# Patient Record
Sex: FEMALE | Race: BLACK OR AFRICAN AMERICAN | NOT HISPANIC OR LATINO | Employment: FULL TIME | URBAN - METROPOLITAN AREA
[De-identification: names, ages, dates, MRNs, and addresses within clinical notes are randomized per-mention and may not be internally consistent; named-entity substitution may affect disease eponyms.]

---

## 2022-08-23 ENCOUNTER — OFFICE VISIT (OUTPATIENT)
Dept: PAIN MEDICINE | Facility: CLINIC | Age: 47
End: 2022-08-23
Payer: COMMERCIAL

## 2022-08-23 VITALS
WEIGHT: 236.88 LBS | BODY MASS INDEX: 40.44 KG/M2 | DIASTOLIC BLOOD PRESSURE: 92 MMHG | SYSTOLIC BLOOD PRESSURE: 128 MMHG | HEART RATE: 87 BPM | HEIGHT: 64 IN

## 2022-08-23 DIAGNOSIS — G89.29 CHRONIC BILATERAL LOW BACK PAIN, UNSPECIFIED WHETHER SCIATICA PRESENT: Primary | ICD-10-CM

## 2022-08-23 DIAGNOSIS — M54.50 CHRONIC BILATERAL LOW BACK PAIN, UNSPECIFIED WHETHER SCIATICA PRESENT: Primary | ICD-10-CM

## 2022-08-23 PROCEDURE — 3008F BODY MASS INDEX DOCD: CPT | Mod: CPTII,S$GLB,, | Performed by: PHYSICIAN ASSISTANT

## 2022-08-23 PROCEDURE — 1160F PR REVIEW ALL MEDS BY PRESCRIBER/CLIN PHARMACIST DOCUMENTED: ICD-10-PCS | Mod: CPTII,S$GLB,, | Performed by: PHYSICIAN ASSISTANT

## 2022-08-23 PROCEDURE — 99203 PR OFFICE/OUTPT VISIT, NEW, LEVL III, 30-44 MIN: ICD-10-PCS | Mod: S$GLB,,, | Performed by: PHYSICIAN ASSISTANT

## 2022-08-23 PROCEDURE — 1159F MED LIST DOCD IN RCRD: CPT | Mod: CPTII,S$GLB,, | Performed by: PHYSICIAN ASSISTANT

## 2022-08-23 PROCEDURE — 99999 PR PBB SHADOW E&M-EST. PATIENT-LVL IV: CPT | Mod: PBBFAC,,, | Performed by: PHYSICIAN ASSISTANT

## 2022-08-23 PROCEDURE — 1159F PR MEDICATION LIST DOCUMENTED IN MEDICAL RECORD: ICD-10-PCS | Mod: CPTII,S$GLB,, | Performed by: PHYSICIAN ASSISTANT

## 2022-08-23 PROCEDURE — 1160F RVW MEDS BY RX/DR IN RCRD: CPT | Mod: CPTII,S$GLB,, | Performed by: PHYSICIAN ASSISTANT

## 2022-08-23 PROCEDURE — 99999 PR PBB SHADOW E&M-EST. PATIENT-LVL IV: ICD-10-PCS | Mod: PBBFAC,,, | Performed by: PHYSICIAN ASSISTANT

## 2022-08-23 PROCEDURE — 3080F PR MOST RECENT DIASTOLIC BLOOD PRESSURE >= 90 MM HG: ICD-10-PCS | Mod: CPTII,S$GLB,, | Performed by: PHYSICIAN ASSISTANT

## 2022-08-23 PROCEDURE — 3074F SYST BP LT 130 MM HG: CPT | Mod: CPTII,S$GLB,, | Performed by: PHYSICIAN ASSISTANT

## 2022-08-23 PROCEDURE — 99203 OFFICE O/P NEW LOW 30 MIN: CPT | Mod: S$GLB,,, | Performed by: PHYSICIAN ASSISTANT

## 2022-08-23 PROCEDURE — 3008F PR BODY MASS INDEX (BMI) DOCUMENTED: ICD-10-PCS | Mod: CPTII,S$GLB,, | Performed by: PHYSICIAN ASSISTANT

## 2022-08-23 PROCEDURE — 3074F PR MOST RECENT SYSTOLIC BLOOD PRESSURE < 130 MM HG: ICD-10-PCS | Mod: CPTII,S$GLB,, | Performed by: PHYSICIAN ASSISTANT

## 2022-08-23 PROCEDURE — 3080F DIAST BP >= 90 MM HG: CPT | Mod: CPTII,S$GLB,, | Performed by: PHYSICIAN ASSISTANT

## 2022-08-23 RX ORDER — CETIRIZINE HYDROCHLORIDE 10 MG/1
10 TABLET ORAL
COMMUNITY
Start: 2021-09-14 | End: 2022-09-14

## 2022-08-23 RX ORDER — DEXTROAMPHETAMINE SULFATE, DEXTROAMPHETAMINE SACCHARATE, AMPHETAMINE ASPARTATE MONOHYDRATE, AND AMPHETAMINE SULFATE 3.125; 3.125; 3.125; 3.125 MG/1; MG/1; MG/1; MG/1
1 CAPSULE, EXTENDED RELEASE ORAL EVERY MORNING
COMMUNITY
Start: 2022-08-18 | End: 2023-08-01

## 2022-08-23 RX ORDER — RIMEGEPANT SULFATE 75 MG/75MG
TABLET, ORALLY DISINTEGRATING ORAL
COMMUNITY
Start: 2022-08-16

## 2022-08-23 RX ORDER — PANTOPRAZOLE SODIUM 40 MG/1
40 TABLET, DELAYED RELEASE ORAL
COMMUNITY
Start: 2022-07-19

## 2022-08-23 RX ORDER — TRIAMTERENE/HYDROCHLOROTHIAZID 37.5-25 MG
TABLET ORAL
COMMUNITY
End: 2023-08-01

## 2022-08-23 RX ORDER — METHOCARBAMOL 500 MG/1
500 TABLET, FILM COATED ORAL
COMMUNITY
Start: 2022-03-11

## 2022-08-23 RX ORDER — SEMAGLUTIDE 1.34 MG/ML
0.5 INJECTION, SOLUTION SUBCUTANEOUS
COMMUNITY
Start: 2022-07-19 | End: 2023-08-01

## 2022-08-23 RX ORDER — BUPROPION HYDROCHLORIDE 300 MG/1
300 TABLET ORAL
COMMUNITY
Start: 2021-09-14

## 2022-08-23 RX ORDER — ACETAMINOPHEN AND CODEINE PHOSPHATE 120; 12 MG/5ML; MG/5ML
1 SOLUTION ORAL DAILY
COMMUNITY
Start: 2022-07-01 | End: 2023-09-21

## 2022-08-23 RX ORDER — VALACYCLOVIR HYDROCHLORIDE 500 MG/1
500 TABLET, FILM COATED ORAL
COMMUNITY
Start: 2022-08-17

## 2022-08-23 RX ORDER — FLUOXETINE HYDROCHLORIDE 20 MG/1
20 CAPSULE ORAL
COMMUNITY
Start: 2022-07-19 | End: 2023-08-01
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
## 2022-08-31 ENCOUNTER — CLINICAL SUPPORT (OUTPATIENT)
Dept: REHABILITATION | Facility: HOSPITAL | Age: 47
End: 2022-08-31
Payer: COMMERCIAL

## 2022-08-31 DIAGNOSIS — G89.29 CHRONIC BILATERAL LOW BACK PAIN, UNSPECIFIED WHETHER SCIATICA PRESENT: ICD-10-CM

## 2022-08-31 DIAGNOSIS — M54.50 CHRONIC BILATERAL LOW BACK PAIN WITHOUT SCIATICA: ICD-10-CM

## 2022-08-31 DIAGNOSIS — M54.50 CHRONIC BILATERAL LOW BACK PAIN, UNSPECIFIED WHETHER SCIATICA PRESENT: ICD-10-CM

## 2022-08-31 DIAGNOSIS — G89.29 CHRONIC BILATERAL LOW BACK PAIN WITHOUT SCIATICA: ICD-10-CM

## 2022-08-31 DIAGNOSIS — R29.898 DECREASED STRENGTH OF TRUNK AND BACK: ICD-10-CM

## 2022-08-31 PROCEDURE — 97110 THERAPEUTIC EXERCISES: CPT | Mod: PO | Performed by: PHYSICAL THERAPIST

## 2022-08-31 PROCEDURE — 97161 PT EVAL LOW COMPLEX 20 MIN: CPT | Mod: PO | Performed by: PHYSICAL THERAPIST
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
## 2022-09-01 PROBLEM — M54.50 CHRONIC BILATERAL LOW BACK PAIN WITHOUT SCIATICA: Status: ACTIVE | Noted: 2022-09-01

## 2022-09-01 PROBLEM — R29.898 DECREASED STRENGTH OF TRUNK AND BACK: Status: ACTIVE | Noted: 2022-09-01

## 2022-09-01 PROBLEM — G89.29 CHRONIC BILATERAL LOW BACK PAIN WITHOUT SCIATICA: Status: ACTIVE | Noted: 2022-09-01

## 2022-09-07 ENCOUNTER — CLINICAL SUPPORT (OUTPATIENT)
Dept: REHABILITATION | Facility: HOSPITAL | Age: 47
End: 2022-09-07
Payer: COMMERCIAL

## 2022-09-07 DIAGNOSIS — M54.50 CHRONIC BILATERAL LOW BACK PAIN WITHOUT SCIATICA: Primary | ICD-10-CM

## 2022-09-07 DIAGNOSIS — G89.29 CHRONIC BILATERAL LOW BACK PAIN WITHOUT SCIATICA: Primary | ICD-10-CM

## 2022-09-07 DIAGNOSIS — R29.898 DECREASED STRENGTH OF TRUNK AND BACK: ICD-10-CM

## 2022-09-07 PROCEDURE — 97110 THERAPEUTIC EXERCISES: CPT | Mod: PO,CQ

## 2022-09-07 NOTE — PROGRESS NOTES
[facility]      Name: Natali Tubbs  Clinic Number: [id number]    Therapy Diagnosis:   Encounter Diagnoses   Name Primary?    Chronic bilateral low back pain without sciatica Yes    Decreased strength of trunk and back      Physician: Sharmin Aguirre PA-C    Visit Date: 2022    Physician: Sharmin Aguirre PA-C     Physician Orders: PT Eval and Treat [facility]  Medical Diagnosis from Referral: Chronic bilateral low back pain  Evaluation Date: 2022  Authorization Period Expiration: 23  Plan of Care Expiration: 22  Reassessment Due: 10th visit  Visit # / Visits authorized:      Time In: 3:23 PM  Time Out: 4:21PM  Total Billable Time:  45 minutes  Insurance:Fee for service Insurance Patient        Precautions: Standard     Pattern of pain determined: Pattern 2  Subjective   Natali reports that the stretches help her a little.    Patient reports tolerating previous visit was well with minimal LB soreness.  Patient reports their pain to be  3 /10 on a 0-10 scale with 0 being no pain and 10 being the worst pain imaginable.  Pain Location: B LB     Occupation: nurse practioner  Leisure: exercise, ride bike, read      Pts goals: conservative way to manage her back pain, do exercise that doesn't hurt her back  Objective     Baseline IM Testing Results:     Baseline Isometric Testing on Med X equipment: Testing administered by PT  Date of testin22  ROM  0-45 deg   Max Peak Torque  131   Min Peak Torque  42   Flex/Ext Ratio 3:1   % below normative data 42     Treatment    Pt was instructed in and performed the following:     Natali received therapeutic exercises to develop/improved posture, cardiovascular endurance, muscular endurance, lumbar/cervical ROM, strength and muscular endurance for 58 minutes including the following exercises:     Single KTC 5x 5 sec ea  Bilateral KTC 5 sec x5  Posterior pelvic tilt x20  Seated lumbar flexion 5x 5  sec  [facility] 9/7/2022   Visit Number 2   VAS Pain Rating 3   Time 10   Flexion in Lying 5   Flexion in Sitting 5   Lumbar Extension Seat Pad -   Femur Restraint -   Top Dead Center -   Counterweight -   Lumbar Flexion -   Lumbar Extension -   Lumbar Peak Torque -   Min Torque -   Test Percent Below Normative Data -   Lumbar Weight 65   Repetitions 18   Rating of Perceived Exertion 4   Ice - Z Lie (in min.) 10        Peripheral muscle strengthening which included 1 set of 15-20 repetitions at a slow, controlled 10-13 second per rep pace focused on strengthening supporting musculature for improved body mechanics and functional mobility.  Pt and therapist focused on proper form during treatment to ensure optimal strengthening of each targeted muscle group.  Machines were utilized including torso rotation, leg extension, leg curl, chest press, upright row. Tricep extension, bicep curl, leg press, and hip abduction added visit 3    Natali received the following manual therapy techniques: n/a were applied to the: n/a for 00 minutes.         Home Exercises Provided and Patient Education Provided   Home exercises include:  Single KTC 5x 5 sec ea  Bilateral KTC 5 sec x5  Posterior pelvic tilt x20  Seated lumbar flexion 5x 5 sec    Cardio program:  Lifting education date:  Posture/Lumbar roll:     Education provided:   - Educated pt on the importance of daily stretch to increase the benefit of therapy and positive results.      Written Home Exercises Provided: Patient instructed to cont prior HEP.  Exercises were reviewed and Justin was able to demonstrate them prior to the end of the session.  Justin demonstrated good  understanding of the education provided.     See EMR under Patient Instructions for exercises provided prior visit.          Assessment   Pt with minimal LBP that did decrease a little during and post session. Reviewed HEP with mod vc for tech. Pt demo well. Pt tolerated lumbar medx machine with  starting weight 50% of pts max peak torque with no c/o pain. Pt tolerated the medx machines well to the LE machines with no c/o increased LB pain or any limb pain.       Patient is making good progress towards established goals.  Pt will continue to benefit from skilled outpatient physical therapy to address the deficits stated in the impairment chart, provide pt/family education and to maximize pt's level of independence in the home and community environment.     Anticipated Barriers for therapy: none  Pt's spiritual, cultural and educational needs considered and pt agreeable to plan of care and goals as stated below:       GOALS: Pt is in agreement with the following goals.     Short term goals:  6 weeks or 10 visits   1.  Pt will demonstrate increased lumbar ROM by at least 3 degrees from the initial ROM value with improvements noted in functional ROM and ability to perform ADLs.  (approp and ongoing)  2.  Pt will demonstrate increased MedX average isometric strength value  by 20% from initial test resulting in improved ability to perform bending, lifting, and carrying activities safely, confidently.  (approp and ongoing)  3.  Patient report a reduction in worst pain score by 1-2 points for improved tolerance for standing, prolonged sitting and exercise.  (approp and ongoing)  4.  Pt able to perform HEP correctly with minimal cueing or supervision from therapist to encourage independent management of symptoms. (approp and ongoing)        Long term goals: 10 weeks or 20 visits   1. Pt will demonstrate increased lumbar ROM by at least 6 degrees from initial ROM value, resulting in improved ability to perform functional fwd bending while standing and sitting. (approp and ongoing)  2. Pt will demonstrate increased MedX average isometric strength value  by 30% from initial test resulting in improved ability to perform bending, lifting, and carrying activities safely, confidently.  (approp and ongoing)  3. Pt to  demonstrate ability to independently control and reduce their pain through posture positioning and mechanical movements throughout a typical day.  (approp and ongoing)  4.  Pt will demonstrate reduced pain and improved functional outcomes as reported on the FOTO by reaching a limitation score of < or = 28% or less in order to demonstrate subjective improvement in pt's condition.    (approp and ongoing)  5. Pt will demonstrate independence with the HEP at discharge  (approp and ongoing)  6.  Pt will understand and apply conservative way to manage her back pain, do exercise that doesn't hurt her back(patient goal)  (approp and ongoing    Plan   Continue with established Plan of Care towards established PT goals.

## 2022-09-08 ENCOUNTER — PATIENT MESSAGE (OUTPATIENT)
Dept: REHABILITATION | Facility: HOSPITAL | Age: 47
End: 2022-09-08
Payer: COMMERCIAL

## 2022-09-12 ENCOUNTER — CLINICAL SUPPORT (OUTPATIENT)
Dept: REHABILITATION | Facility: HOSPITAL | Age: 47
End: 2022-09-12
Payer: COMMERCIAL

## 2022-09-12 DIAGNOSIS — M54.50 CHRONIC BILATERAL LOW BACK PAIN WITHOUT SCIATICA: Primary | ICD-10-CM

## 2022-09-12 DIAGNOSIS — R29.898 DECREASED STRENGTH OF TRUNK AND BACK: ICD-10-CM

## 2022-09-12 DIAGNOSIS — G89.29 CHRONIC BILATERAL LOW BACK PAIN WITHOUT SCIATICA: Primary | ICD-10-CM

## 2022-09-12 PROCEDURE — 97110 THERAPEUTIC EXERCISES: CPT | Mod: PO | Performed by: PHYSICAL THERAPIST

## 2022-09-12 NOTE — PROGRESS NOTES
[facility]      Name: Natali Steen Tubbs  Clinic Number: [id number]    Therapy Diagnosis:   Encounter Diagnoses   Name Primary?    Chronic bilateral low back pain without sciatica Yes    Decreased strength of trunk and back        Physician: Sharmin Aguirre PA-C    Visit Date: 2022    Physician: Sharmin Aguirre PA-C     Physician Orders: PT Eval and Treat [facility]  Medical Diagnosis from Referral: Chronic bilateral low back pain  Evaluation Date: 2022  Authorization Period Expiration: 23  Plan of Care Expiration: 22  Reassessment Due: 10th visit  Visit # / Visits authorized: 3/ 20     Time In: 3:10PM  Time Out: 4:05PM  Total Billable Time:  45 minutes  Insurance:Fee for service Insurance Patient        Precautions: Standard     Pattern of pain determined: Pattern 2  Subjective   Natali reports that she took grandchildren to jumping party and did a lot of cooking this weekend. Standing increases pain. Long day at work today and has some increased pain. She reports some decrease in pain with stretches.    Patient reports tolerating previous visit was well with minimal LB soreness.  Patient reports their pain to be  7 /10 on a 0-10 scale with 0 being no pain and 10 being the worst pain imaginable.  Pain Location:  bilateral back, post thighs      Occupation: nurse practioner  Leisure: exercise, ride bike, read      Pts goals: conservative way to manage her back pain, do exercise that doesn't hurt her back  Objective     Baseline IM Testing Results:     Baseline Isometric Testing on Med X equipment: Testing administered by PT  Date of testin22  ROM  0-45 deg   Max Peak Torque  131   Min Peak Torque  42   Flex/Ext Ratio 3:1   % below normative data 42     Treatment    Pt was instructed in and performed the following:     Natali received therapeutic exercises to develop/improved posture, cardiovascular endurance, muscular endurance, lumbar/cervical  ROM, strength and muscular endurance for 45 minutes including the following exercises:     Single KTC 5x 5 sec ea  Bilateral KTC 5 sec x5  Posterior pelvic tilt x20  Seated lumbar flexion 5x 5 sec  Lumbar stabilization supine march in place x20    [facility] 9/12/2022   Visit Number 3   VAS Pain Rating 7   Time -   Flexion in Lying 5   Flexion in Sitting 5   Lumbar Extension Seat Pad -   Femur Restraint -   Top Dead Center -   Counterweight -   Lumbar Flexion -   Lumbar Extension -   Lumbar Peak Torque -   Min Torque -   Test Percent Below Normative Data -   Lumbar Weight 65   Repetitions 18   Rating of Perceived Exertion 5   Ice - Z Lie (in min.) 10            Peripheral muscle strengthening which included 1 set of 15-20 repetitions at a slow, controlled 10-13 second per rep pace focused on strengthening supporting musculature for improved body mechanics and functional mobility.  Pt and therapist focused on proper form during treatment to ensure optimal strengthening of each targeted muscle group.  Machines were utilized including torso rotation, leg extension, leg curl, chest press, upright row. Tricep extension, bicep curl, leg press, and hip abduction added visit 3    Natali received the following manual therapy techniques: n/a were applied to the: n/a for 00 minutes.         Home Exercises Provided and Patient Education Provided   Home exercises include:  Single KTC 5x 5 sec ea  Bilateral KTC 5 sec x5  Posterior pelvic tilt x20  Seated lumbar flexion 5x 5 sec    Cardio program:  Lifting education date:  Posture/Lumbar roll:     Education provided:   - Educated pt on the importance of daily stretch to increase the benefit of therapy and positive results.      Written Home Exercises Provided: Patient instructed to cont prior HEP.  Exercises were reviewed and Justin was able to demonstrate them prior to the end of the session.  Justin demonstrated good  understanding of the education provided.     See  EMR under Patient Instructions for exercises provided prior visit.          Assessment     Pt needed verbal and manual cues to correct posterior pelvic tilt. Once achieved proper form, added more lumbar stabilization. Pt responds favorably to flexion stretch. Encouraged frequent seated flexion stretch throughout day as well as awareness of improved abdominal tone. Exercises slightly more challenging today with higher perceived exertion. Initiated UE exercise today.     Patient is making good progress towards established goals.  Pt will continue to benefit from skilled outpatient physical therapy to address the deficits stated in the impairment chart, provide pt/family education and to maximize pt's level of independence in the home and community environment.     Anticipated Barriers for therapy: none  Pt's spiritual, cultural and educational needs considered and pt agreeable to plan of care and goals as stated below:       GOALS: Pt is in agreement with the following goals.     Short term goals:  6 weeks or 10 visits   1.  Pt will demonstrate increased lumbar ROM by at least 3 degrees from the initial ROM value with improvements noted in functional ROM and ability to perform ADLs.  (approp and ongoing)  2.  Pt will demonstrate increased MedX average isometric strength value  by 20% from initial test resulting in improved ability to perform bending, lifting, and carrying activities safely, confidently.  (approp and ongoing)  3.  Patient report a reduction in worst pain score by 1-2 points for improved tolerance for standing, prolonged sitting and exercise.  (approp and ongoing)  4.  Pt able to perform HEP correctly with minimal cueing or supervision from therapist to encourage independent management of symptoms. (approp and ongoing)        Long term goals: 10 weeks or 20 visits   1. Pt will demonstrate increased lumbar ROM by at least 6 degrees from initial ROM value, resulting in improved ability to perform  functional fwd bending while standing and sitting. (approp and ongoing)  2. Pt will demonstrate increased MedX average isometric strength value  by 30% from initial test resulting in improved ability to perform bending, lifting, and carrying activities safely, confidently.  (approp and ongoing)  3. Pt to demonstrate ability to independently control and reduce their pain through posture positioning and mechanical movements throughout a typical day.  (approp and ongoing)  4.  Pt will demonstrate reduced pain and improved functional outcomes as reported on the FOTO by reaching a limitation score of < or = 28% or less in order to demonstrate subjective improvement in pt's condition.    (approp and ongoing)  5. Pt will demonstrate independence with the HEP at discharge  (approp and ongoing)  6.  Pt will understand and apply conservative way to manage her back pain, do exercise that doesn't hurt her back(patient goal)  (approp and ongoing    Plan   Continue with established Plan of Care towards established PT goals.

## 2022-09-14 ENCOUNTER — CLINICAL SUPPORT (OUTPATIENT)
Dept: REHABILITATION | Facility: HOSPITAL | Age: 47
End: 2022-09-14
Payer: COMMERCIAL

## 2022-09-14 DIAGNOSIS — R29.898 DECREASED STRENGTH OF TRUNK AND BACK: ICD-10-CM

## 2022-09-14 DIAGNOSIS — M54.50 CHRONIC BILATERAL LOW BACK PAIN WITHOUT SCIATICA: Primary | ICD-10-CM

## 2022-09-14 DIAGNOSIS — G89.29 CHRONIC BILATERAL LOW BACK PAIN WITHOUT SCIATICA: Primary | ICD-10-CM

## 2022-09-14 PROCEDURE — 97110 THERAPEUTIC EXERCISES: CPT | Mod: PO | Performed by: PHYSICAL THERAPIST

## 2022-09-14 PROCEDURE — 97750 PHYSICAL PERFORMANCE TEST: CPT | Mod: 32,PO | Performed by: PHYSICAL THERAPIST

## 2022-09-14 NOTE — PROGRESS NOTES
[facility]      Name: Natali Tubbs  Clinic Number: [id number]    Therapy Diagnosis:   Encounter Diagnoses   Name Primary?    Chronic bilateral low back pain without sciatica Yes    Decreased strength of trunk and back          Physician: Sharmin Aguirre PA-C    Visit Date: 2022    Physician: Sharmin Aguirre PA-C     Physician Orders: PT Eval and Treat [facility]  Medical Diagnosis from Referral: Chronic bilateral low back pain  Evaluation Date: 2022  Authorization Period Expiration: 23  Plan of Care Expiration: 22  Reassessment Due: 10th visit  Visit # / Visits authorized:      Time In: 2:30PM  Time Out: 3:25PM  Total Billable Time:  45 minutes  Insurance:Fee for service Insurance Patient        Precautions: Standard     Pattern of pain determined: Pattern 2  Subjective   Natali reports she is feeling better today, but was sore all over following last visit.  Patient reports tolerating previous visit was well with general soreness.  Patient reports their pain to be  2 /10 on a 0-10 scale with 0 being no pain and 10 being the worst pain imaginable.  Pain Location:  bilateral back, post thighs      Occupation: nurse practioner  Leisure: exercise, ride bike, read      Pts goals: conservative way to manage her back pain, do exercise that doesn't hurt her back  Objective     Baseline IM Testing Results:     Baseline Isometric Testing on Med X equipment: Testing administered by PT  Date of testin22  ROM  0-45 deg   Max Peak Torque  131   Min Peak Torque  42   Flex/Ext Ratio 3:1   % below normative data 42     Treatment    Pt was instructed in and performed the following:     Natali received therapeutic exercises to develop/improved posture, cardiovascular endurance, muscular endurance, lumbar/cervical ROM, strength and muscular endurance for 45 minutes including the following exercises:     Single KTC 5x 5 sec ea  Bilateral KTC 5 sec  x5  Posterior pelvic tilt x20  Seated lumbar flexion 5x 5 sec  Lumbar stabilization supine march in place x20  Lumbar stabilization leg extension 2/10 ea    [facility] 9/14/2022   Visit Number 4   VAS Pain Rating 2   Time -   Flexion in Lying 5   Flexion in Sitting 5   Lumbar Extension Seat Pad -   Femur Restraint -   Top Dead Center -   Counterweight -   Lumbar Flexion -   Lumbar Extension -   Lumbar Peak Torque -   Min Torque -   Test Percent Below Normative Data -   Lumbar Weight 65   Repetitions 16   Rating of Perceived Exertion 6   Ice - Z Lie (in min.) 10                Peripheral muscle strengthening which included 1 set of 15-20 repetitions at a slow, controlled 10-13 second per rep pace focused on strengthening supporting musculature for improved body mechanics and functional mobility.  Pt and therapist focused on proper form during treatment to ensure optimal strengthening of each targeted muscle group.  Machines were utilized including torso rotation, leg extension, leg curl, chest press, upright row. Tricep extension, bicep curl, leg press, and hip abduction added visit 3    Natali received the following manual therapy techniques: n/a were applied to the: n/a for 00 minutes.         Home Exercises Provided and Patient Education Provided   Home exercises include:  Single KTC 5x 5 sec ea  Bilateral KTC 5 sec x5  Posterior pelvic tilt x20  Seated lumbar flexion 5x 5 sec    Cardio program:  Lifting education date:  Posture/Lumbar roll:     Education provided:   - Educated pt on the importance of daily stretch to increase the benefit of therapy and positive results.      Written Home Exercises Provided: Patient instructed to cont prior HEP.  Exercises were reviewed and Justin was able to demonstrate them prior to the end of the session.  Justin demonstrated good  understanding of the education provided.     See EMR under Patient Instructions for exercises provided prior visit.          Assessment      Pt has reduced pain level today. Much improved control with lumbar stabilization. Advanced to leg extension with PPT. Demonstrated good control and stability. Higher perceived exertion on MEDX and leg curl exercises today. Improved with torso rotation.  Patient is making good progress towards established goals.  Pt will continue to benefit from skilled outpatient physical therapy to address the deficits stated in the impairment chart, provide pt/family education and to maximize pt's level of independence in the home and community environment.     Anticipated Barriers for therapy: none  Pt's spiritual, cultural and educational needs considered and pt agreeable to plan of care and goals as stated below:       GOALS: Pt is in agreement with the following goals.     Short term goals:  6 weeks or 10 visits   1.  Pt will demonstrate increased lumbar ROM by at least 3 degrees from the initial ROM value with improvements noted in functional ROM and ability to perform ADLs.  (approp and ongoing)  2.  Pt will demonstrate increased MedX average isometric strength value  by 20% from initial test resulting in improved ability to perform bending, lifting, and carrying activities safely, confidently.  (approp and ongoing)  3.  Patient report a reduction in worst pain score by 1-2 points for improved tolerance for standing, prolonged sitting and exercise.  (approp and ongoing)  4.  Pt able to perform HEP correctly with minimal cueing or supervision from therapist to encourage independent management of symptoms. (approp and ongoing)        Long term goals: 10 weeks or 20 visits   1. Pt will demonstrate increased lumbar ROM by at least 6 degrees from initial ROM value, resulting in improved ability to perform functional fwd bending while standing and sitting. (approp and ongoing)  2. Pt will demonstrate increased MedX average isometric strength value  by 30% from initial test resulting in improved ability to perform bending,  lifting, and carrying activities safely, confidently.  (approp and ongoing)  3. Pt to demonstrate ability to independently control and reduce their pain through posture positioning and mechanical movements throughout a typical day.  (approp and ongoing)  4.  Pt will demonstrate reduced pain and improved functional outcomes as reported on the FOTO by reaching a limitation score of < or = 28% or less in order to demonstrate subjective improvement in pt's condition.    (approp and ongoing)  5. Pt will demonstrate independence with the HEP at discharge  (approp and ongoing)  6.  Pt will understand and apply conservative way to manage her back pain, do exercise that doesn't hurt her back(patient goal)  (approp and ongoing    Plan   Continue with established Plan of Care towards established PT goals.

## 2022-09-19 ENCOUNTER — CLINICAL SUPPORT (OUTPATIENT)
Dept: REHABILITATION | Facility: HOSPITAL | Age: 47
End: 2022-09-19
Payer: COMMERCIAL

## 2022-09-19 DIAGNOSIS — G89.29 CHRONIC BILATERAL LOW BACK PAIN WITHOUT SCIATICA: Primary | ICD-10-CM

## 2022-09-19 DIAGNOSIS — M54.50 CHRONIC BILATERAL LOW BACK PAIN WITHOUT SCIATICA: Primary | ICD-10-CM

## 2022-09-19 DIAGNOSIS — R29.898 DECREASED STRENGTH OF TRUNK AND BACK: ICD-10-CM

## 2022-09-19 PROCEDURE — 97110 THERAPEUTIC EXERCISES: CPT | Mod: PO,CQ

## 2022-09-19 NOTE — PROGRESS NOTES
[facility]      Name: Natali Tubbs  Clinic Number: [id number]    Therapy Diagnosis:   Encounter Diagnoses   Name Primary?    Chronic bilateral low back pain without sciatica Yes    Decreased strength of trunk and back            Physician: Sharmin Aguirre PA-C    Visit Date: 2022    Physician: Sharmin Aguirre PA-C     Physician Orders: PT Eval and Treat [facility]  Medical Diagnosis from Referral: Chronic bilateral low back pain  Evaluation Date: 2022  Authorization Period Expiration: 23  Plan of Care Expiration: 22  Reassessment Due: 10th visit  Visit # / Visits authorized:      Time In: 2:30PM  Time Out: 3:30PM  Total Billable Time:  45 minutes  Insurance:Fee for service Insurance Patient        Precautions: Standard     Pattern of pain determined: Pattern 2  Subjective   Natali reports feeling sore due to working this weekend.   Patient reports tolerating previous visit was well with general soreness.  Patient reports their pain to be  2 /10 on a 0-10 scale with 0 being no pain and 10 being the worst pain imaginable.  Pain Location:  bilateral back, post thighs      Occupation: nurse practioner  Leisure: exercise, ride bike, read      Pts goals: conservative way to manage her back pain, do exercise that doesn't hurt her back  Objective     Baseline IM Testing Results:     Baseline Isometric Testing on Med X equipment: Testing administered by PT  Date of testin22  ROM  0-45 deg   Max Peak Torque  131   Min Peak Torque  42   Flex/Ext Ratio 3:1   % below normative data 42     Treatment    Pt was instructed in and performed the following:     Natali received therapeutic exercises to develop/improved posture, cardiovascular endurance, muscular endurance, lumbar/cervical ROM, strength and muscular endurance for 460minutes including the following exercises:     Single KTC 5x 5 sec ea  Bilateral KTC 5 sec x5  Posterior pelvic tilt  x20  Seated lumbar flexion 5x 5 sec  Lumbar stabilization supine march in place x20  Lumbar stabilization leg extension 2/10 ea  [facility] 9/19/2022   Visit Number 5   VAS Pain Rating 2   Time 10   Flexion in Lying 5   Flexion in Sitting 5   Lumbar Extension Seat Pad -   Femur Restraint -   Top Dead Center -   Counterweight -   Lumbar Flexion -   Lumbar Extension -   Lumbar Peak Torque -   Min Torque -   Test Percent Below Normative Data -   Lumbar Weight 65   Repetitions 20   Rating of Perceived Exertion 5   Ice - Z Lie (in min.) 10                 Peripheral muscle strengthening which included 1 set of 15-20 repetitions at a slow, controlled 10-13 second per rep pace focused on strengthening supporting musculature for improved body mechanics and functional mobility.  Pt and therapist focused on proper form during treatment to ensure optimal strengthening of each targeted muscle group.  Machines were utilized including torso rotation, leg extension, leg curl, chest press, upright row. Tricep extension, bicep curl, leg press, and hip abduction added visit 3    Natali received the following manual therapy techniques: n/a were applied to the: n/a for 00 minutes.         Home Exercises Provided and Patient Education Provided   Home exercises include:  Single KTC 5x 5 sec ea  Bilateral KTC 5 sec x5  Posterior pelvic tilt x20  Seated lumbar flexion 5x 5 sec    Cardio program:9/19/22 [place] at home  Lifting education date:  Posture/Lumbar roll:     Education provided:   - Educated pt on the importance of daily stretch to increase the benefit of therapy and positive results.      Written Home Exercises Provided: Patient instructed to cont prior HEP.  Exercises were reviewed and Justin was able to demonstrate them prior to the end of the session.  Justin demonstrated good  understanding of the education provided.     See EMR under Patient Instructions for exercises provided prior visit.          Assessment      Pt has reduced pain level today. Much improved control with lumbar stabilization. Demonstrated good control and stability. Pt able to progress to 20 reps on the lumbar machine. Discussed the importance of starting a cardio program at home and issued her packet. She has a peleton that she has not been on due to her back. She is going to try again with starting at 10 mins and not do an intense program. Foto done.  Patient is making good progress towards established goals.  Pt will continue to benefit from skilled outpatient physical therapy to address the deficits stated in the impairment chart, provide pt/family education and to maximize pt's level of independence in the home and community environment.     Anticipated Barriers for therapy: none  Pt's spiritual, cultural and educational needs considered and pt agreeable to plan of care and goals as stated below:       GOALS: Pt is in agreement with the following goals.     Short term goals:  6 weeks or 10 visits   1.  Pt will demonstrate increased lumbar ROM by at least 3 degrees from the initial ROM value with improvements noted in functional ROM and ability to perform ADLs.  (approp and ongoing)  2.  Pt will demonstrate increased MedX average isometric strength value  by 20% from initial test resulting in improved ability to perform bending, lifting, and carrying activities safely, confidently.  (approp and ongoing)  3.  Patient report a reduction in worst pain score by 1-2 points for improved tolerance for standing, prolonged sitting and exercise.  (approp and ongoing)  4.  Pt able to perform HEP correctly with minimal cueing or supervision from therapist to encourage independent management of symptoms. (approp and ongoing)        Long term goals: 10 weeks or 20 visits   1. Pt will demonstrate increased lumbar ROM by at least 6 degrees from initial ROM value, resulting in improved ability to perform functional fwd bending while standing and sitting. (approp and  ongoing)  2. Pt will demonstrate increased MedX average isometric strength value  by 30% from initial test resulting in improved ability to perform bending, lifting, and carrying activities safely, confidently.  (approp and ongoing)  3. Pt to demonstrate ability to independently control and reduce their pain through posture positioning and mechanical movements throughout a typical day.  (approp and ongoing)  4.  Pt will demonstrate reduced pain and improved functional outcomes as reported on the FOTO by reaching a limitation score of < or = 28% or less in order to demonstrate subjective improvement in pt's condition.    (approp and ongoing)  5. Pt will demonstrate independence with the HEP at discharge  (approp and ongoing)  6.  Pt will understand and apply conservative way to manage her back pain, do exercise that doesn't hurt her back(patient goal)  (approp and ongoing    Plan   Continue with established Plan of Care towards established PT goals.

## 2022-09-21 ENCOUNTER — CLINICAL SUPPORT (OUTPATIENT)
Dept: REHABILITATION | Facility: HOSPITAL | Age: 47
End: 2022-09-21
Payer: COMMERCIAL

## 2022-09-21 DIAGNOSIS — M54.50 CHRONIC BILATERAL LOW BACK PAIN WITHOUT SCIATICA: Primary | ICD-10-CM

## 2022-09-21 DIAGNOSIS — R29.898 DECREASED STRENGTH OF TRUNK AND BACK: ICD-10-CM

## 2022-09-21 DIAGNOSIS — G89.29 CHRONIC BILATERAL LOW BACK PAIN WITHOUT SCIATICA: Primary | ICD-10-CM

## 2022-09-21 PROCEDURE — 97110 THERAPEUTIC EXERCISES: CPT | Mod: PO | Performed by: PHYSICAL THERAPIST

## 2022-09-21 NOTE — PROGRESS NOTES
[facility]      Name: Natali Tubbs  Clinic Number: [id number]    Therapy Diagnosis:   Encounter Diagnoses   Name Primary?    Chronic bilateral low back pain without sciatica Yes    Decreased strength of trunk and back              Physician: Sharmin Aguirre PA-C    Visit Date: 2022    Physician: Sharmin Aguirre PA-C     Physician Orders: PT Eval and Treat [facility]  Medical Diagnosis from Referral: Chronic bilateral low back pain  Evaluation Date: 2022  Authorization Period Expiration: 23  Plan of Care Expiration: 22  Reassessment Due: 10th visit  Visit # / Visits authorized:      Time In: 3:20PM  Time Out: 4:15PM  Total Billable Time:  45 minutes  Insurance:Fee for service Insurance Patient        Precautions: Standard     Pattern of pain determined: Pattern 2  Subjective   Natali reports she is without pain currently and does report decreased pain with doing exercises at home and with improved abdominal tone.  Patient reports tolerating previous visit was well with general soreness.  Patient reports their pain to be  0 /10 on a 0-10 scale with 0 being no pain and 10 being the worst pain imaginable.  Pain Location:  bilateral back, post thighs      Occupation: nurse practioner  Leisure: exercise, ride bike, read      Pts goals: conservative way to manage her back pain, do exercise that doesn't hurt her back  Objective     Baseline IM Testing Results:     Baseline Isometric Testing on Med X equipment: Testing administered by PT  Date of testin22  ROM  0-45 deg   Max Peak Torque  131   Min Peak Torque  42   Flex/Ext Ratio 3:1   % below normative data 42     Treatment    Pt was instructed in and performed the following:     Natali received therapeutic exercises to develop/improved posture, cardiovascular endurance, muscular endurance, lumbar/cervical ROM, strength and muscular endurance for 45 minutes including the following  exercises:     Single KTC 5x 5 sec ea  Bilateral KTC 5 sec x5  Posterior pelvic tilt x20  Seated lumbar flexion 5x 5 sec  Lumbar stabilization supine march in place x20  Lumbar stabilization leg extension 2/10 ea    [facility] 9/21/2022   Visit Number 6   VAS Pain Rating 0   Time -   Flexion in Lying 5   Flexion in Sitting 5   Lumbar Extension Seat Pad -   Femur Restraint -   Top Dead Center -   Counterweight -   Lumbar Flexion -   Lumbar Extension -   Lumbar Peak Torque -   Min Torque -   Test Percent Below Normative Data -   Lumbar Weight 65   Repetitions 20   Rating of Perceived Exertion 3   Ice - Z Lie (in min.) 10                     Peripheral muscle strengthening which included 1 set of 15-20 repetitions at a slow, controlled 10-13 second per rep pace focused on strengthening supporting musculature for improved body mechanics and functional mobility.  Pt and therapist focused on proper form during treatment to ensure optimal strengthening of each targeted muscle group.  Machines were utilized including torso rotation, leg extension, leg curl, chest press, upright row. Tricep extension, bicep curl, leg press, and hip abduction added visit 3    Natali received the following manual therapy techniques: n/a were applied to the: n/a for 00 minutes.         Home Exercises Provided and Patient Education Provided   Home exercises include:  Single KTC 5x 5 sec ea  Bilateral KTC 5 sec x5  Posterior pelvic tilt x20  Seated lumbar flexion 5x 5 sec    Cardio program:9/19/22 [place] at home  Lifting education date:  Posture/Lumbar roll:     Education provided:   - Educated pt on the importance of daily stretch to increase the benefit of therapy and positive results.      Written Home Exercises Provided: Patient instructed to cont prior HEP.  Exercises were reviewed and Justin was able to demonstrate them prior to the end of the session.  Justin demonstrated good  understanding of the education provided.     See  EMR under Patient Instructions for exercises provided prior visit.          Assessment     She has not started cardio yet at home, but plans to. She is demonstrating improved stabilization with exercise and decreased pain with concentrating on improved abdominal tone. She is progressing steadily with reps or resistance with exercise with appropriate perceived exertion.  Patient is making good progress towards established goals.  Pt will continue to benefit from skilled outpatient physical therapy to address the deficits stated in the impairment chart, provide pt/family education and to maximize pt's level of independence in the home and community environment.     Anticipated Barriers for therapy: none  Pt's spiritual, cultural and educational needs considered and pt agreeable to plan of care and goals as stated below:       GOALS: Pt is in agreement with the following goals.     Short term goals:  6 weeks or 10 visits   1.  Pt will demonstrate increased lumbar ROM by at least 3 degrees from the initial ROM value with improvements noted in functional ROM and ability to perform ADLs.  (approp and ongoing)  2.  Pt will demonstrate increased MedX average isometric strength value  by 20% from initial test resulting in improved ability to perform bending, lifting, and carrying activities safely, confidently.  (approp and ongoing)  3.  Patient report a reduction in worst pain score by 1-2 points for improved tolerance for standing, prolonged sitting and exercise.  (approp and ongoing)  4.  Pt able to perform HEP correctly with minimal cueing or supervision from therapist to encourage independent management of symptoms. (approp and ongoing)        Long term goals: 10 weeks or 20 visits   1. Pt will demonstrate increased lumbar ROM by at least 6 degrees from initial ROM value, resulting in improved ability to perform functional fwd bending while standing and sitting. (approp and ongoing)  2. Pt will demonstrate increased  MedX average isometric strength value  by 30% from initial test resulting in improved ability to perform bending, lifting, and carrying activities safely, confidently.  (approp and ongoing)  3. Pt to demonstrate ability to independently control and reduce their pain through posture positioning and mechanical movements throughout a typical day.  (approp and ongoing)  4.  Pt will demonstrate reduced pain and improved functional outcomes as reported on the FOTO by reaching a limitation score of < or = 28% or less in order to demonstrate subjective improvement in pt's condition.    (approp and ongoing)  5. Pt will demonstrate independence with the HEP at discharge  (approp and ongoing)  6.  Pt will understand and apply conservative way to manage her back pain, do exercise that doesn't hurt her back(patient goal)  (approp and ongoing    Plan   Continue with established Plan of Care towards established PT goals.

## 2022-09-26 ENCOUNTER — CLINICAL SUPPORT (OUTPATIENT)
Dept: REHABILITATION | Facility: HOSPITAL | Age: 47
End: 2022-09-26
Payer: COMMERCIAL

## 2022-09-26 DIAGNOSIS — R29.898 DECREASED STRENGTH OF TRUNK AND BACK: ICD-10-CM

## 2022-09-26 DIAGNOSIS — G89.29 CHRONIC BILATERAL LOW BACK PAIN WITHOUT SCIATICA: Primary | ICD-10-CM

## 2022-09-26 DIAGNOSIS — M54.50 CHRONIC BILATERAL LOW BACK PAIN WITHOUT SCIATICA: Primary | ICD-10-CM

## 2022-09-26 PROCEDURE — 97110 THERAPEUTIC EXERCISES: CPT | Mod: PO | Performed by: PHYSICAL THERAPIST

## 2022-09-26 NOTE — PROGRESS NOTES
[facility]      Name: Natali Tubbs  Clinic Number: [id number]    Therapy Diagnosis:   Encounter Diagnoses   Name Primary?    Chronic bilateral low back pain without sciatica Yes    Decreased strength of trunk and back        Physician: Sharmin Aguirre PA-C    Visit Date: 2022    Physician: Sharmin Aguirre PA-C     Physician Orders: PT Eval and Treat [facility]  Medical Diagnosis from Referral: Chronic bilateral low back pain  Evaluation Date: 2022  Authorization Period Expiration: 23  Plan of Care Expiration: 22  Reassessment Due: 10th visit  Visit # / Visits authorized:      Time In: 11:20AM  Time Out: 12:15M  Total Billable Time:  45 minutes  Insurance:Fee for service Insurance Patient        Precautions: Standard     Pattern of pain determined: Pattern 2  Subjective   Natali reports she did a lot of standing in kitchen cooking yesterday and has increased pain today. She did try to interrupt standing with seated flexion stretch which did ease the pain.  Patient reports tolerating previous visit was well with general soreness.  Patient reports their pain to be  8 /10 on a 0-10 scale with 0 being no pain and 10 being the worst pain imaginable.  Pain Location:  bilateral back, post thighs      Occupation: nurse practioner  Leisure: exercise, ride bike, read      Pts goals: conservative way to manage her back pain, do exercise that doesn't hurt her back  Objective     Baseline IM Testing Results:     Baseline Isometric Testing on Med X equipment: Testing administered by PT  Date of testin22  ROM  0-45 deg   Max Peak Torque  131   Min Peak Torque  42   Flex/Ext Ratio 3:1   % below normative data 42     Treatment    Pt was instructed in and performed the following:     Natali received therapeutic exercises to develop/improved posture, cardiovascular endurance, muscular endurance, lumbar/cervical ROM, strength and muscular endurance for  45 minutes including the following exercises:     Single KTC 5x 5 sec ea  Bilateral KTC 5 sec x5  Posterior pelvic tilt x20  Seated lumbar flexion 5x 5 sec  Lumbar stabilization supine march in place x20- np  Lumbar stabilization leg extension 2/10 ea    [facility] 9/26/2022   Visit Number 7   VAS Pain Rating 8   Time -   Flexion in Lying 5   Flexion in Sitting 5   Lumbar Extension Seat Pad -   Femur Restraint -   Top Dead Center -   Counterweight -   Lumbar Flexion -   Lumbar Extension -   Lumbar Peak Torque -   Min Torque -   Test Percent Below Normative Data -   Lumbar Weight 68   Repetitions 18   Rating of Perceived Exertion 3   Ice - Z Lie (in min.) 10         Peripheral muscle strengthening which included 1 set of 15-20 repetitions at a slow, controlled 10-13 second per rep pace focused on strengthening supporting musculature for improved body mechanics and functional mobility.  Pt and therapist focused on proper form during treatment to ensure optimal strengthening of each targeted muscle group.  Machines were utilized including torso rotation, leg extension, leg curl, chest press, upright row. Tricep extension, bicep curl, leg press, and hip abduction added visit 3    Natali received the following manual therapy techniques: n/a were applied to the: n/a for 00 minutes.         Home Exercises Provided and Patient Education Provided   Home exercises include:  Single KTC 5x 5 sec ea  Bilateral KTC 5 sec x5  Posterior pelvic tilt x20  Seated lumbar flexion 5x 5 sec    Cardio program:9/19/22 [place] at home  Lifting education date:  Posture/Lumbar roll:     Education provided:   - Educated pt on the importance of daily stretch to increase the benefit of therapy and positive results.      Written Home Exercises Provided: Patient instructed to cont prior HEP.  Exercises were reviewed and Justin was able to demonstrate them prior to the end of the session.  Justin demonstrated good  understanding of the  education provided.     See EMR under Patient Instructions for exercises provided prior visit.          Assessment     Higher pain rating today after standing in kitchen several hours yesterday. Flexion stretching helps. Increased resistance on MEDX and torso rotation with appropriate perceived exertion. Improving ability to stabilize spine with exercise.  Patient is making good progress towards established goals.  Pt will continue to benefit from skilled outpatient physical therapy to address the deficits stated in the impairment chart, provide pt/family education and to maximize pt's level of independence in the home and community environment.     Anticipated Barriers for therapy: none  Pt's spiritual, cultural and educational needs considered and pt agreeable to plan of care and goals as stated below:       GOALS: Pt is in agreement with the following goals.     Short term goals:  6 weeks or 10 visits   1.  Pt will demonstrate increased lumbar ROM by at least 3 degrees from the initial ROM value with improvements noted in functional ROM and ability to perform ADLs.  (approp and ongoing)  2.  Pt will demonstrate increased MedX average isometric strength value  by 20% from initial test resulting in improved ability to perform bending, lifting, and carrying activities safely, confidently.  (approp and ongoing)  3.  Patient report a reduction in worst pain score by 1-2 points for improved tolerance for standing, prolonged sitting and exercise.  (approp and ongoing)  4.  Pt able to perform HEP correctly with minimal cueing or supervision from therapist to encourage independent management of symptoms. (approp and ongoing)        Long term goals: 10 weeks or 20 visits   1. Pt will demonstrate increased lumbar ROM by at least 6 degrees from initial ROM value, resulting in improved ability to perform functional fwd bending while standing and sitting. (approp and ongoing)  2. Pt will demonstrate increased MedX average  isometric strength value  by 30% from initial test resulting in improved ability to perform bending, lifting, and carrying activities safely, confidently.  (approp and ongoing)  3. Pt to demonstrate ability to independently control and reduce their pain through posture positioning and mechanical movements throughout a typical day.  (approp and ongoing)  4.  Pt will demonstrate reduced pain and improved functional outcomes as reported on the FOTO by reaching a limitation score of < or = 28% or less in order to demonstrate subjective improvement in pt's condition.    (approp and ongoing)  5. Pt will demonstrate independence with the HEP at discharge  (approp and ongoing)  6.  Pt will understand and apply conservative way to manage her back pain, do exercise that doesn't hurt her back(patient goal)  (approp and ongoing    Plan   Continue with established Plan of Care towards established PT goals.

## 2022-09-28 ENCOUNTER — CLINICAL SUPPORT (OUTPATIENT)
Dept: REHABILITATION | Facility: HOSPITAL | Age: 47
End: 2022-09-28
Payer: COMMERCIAL

## 2022-09-28 DIAGNOSIS — M54.50 CHRONIC BILATERAL LOW BACK PAIN WITHOUT SCIATICA: Primary | ICD-10-CM

## 2022-09-28 DIAGNOSIS — R29.898 DECREASED STRENGTH OF TRUNK AND BACK: ICD-10-CM

## 2022-09-28 DIAGNOSIS — G89.29 CHRONIC BILATERAL LOW BACK PAIN WITHOUT SCIATICA: Primary | ICD-10-CM

## 2022-09-28 PROCEDURE — 97110 THERAPEUTIC EXERCISES: CPT | Mod: PO | Performed by: PHYSICAL THERAPIST

## 2022-09-28 NOTE — PROGRESS NOTES
[facility]      Name: Natali Tubbs  Clinic Number: [id number]    Therapy Diagnosis:   Encounter Diagnoses   Name Primary?    Chronic bilateral low back pain without sciatica Yes    Decreased strength of trunk and back          Physician: Sharmin Aguirre PA-C    Visit Date: 2022    Physician: Sharmin Aguirre PA-C     Physician Orders: PT Eval and Treat [facility]  Medical Diagnosis from Referral: Chronic bilateral low back pain  Evaluation Date: 2022  Authorization Period Expiration: 23  Plan of Care Expiration: 22  Reassessment Due: 10th visit  Visit # / Visits authorized:      Time In: 3:15PM  Time Out: 4:08PM  Total Billable Time:  48 minutes  Insurance:Fee for service Insurance Patient        Precautions: Standard     Pattern of pain determined: Pattern 2  Subjective    Natali reports she has less pain today. Her greatest difficulty is standing in kitchen on Sundays when she cooks for week. Decreased pain with posterior pelvic tilt.  Patient reports tolerating previous visit was well with general soreness.  Patient reports their pain to be  3 /10 on a 0-10 scale with 0 being no pain and 10 being the worst pain imaginable.  Pain Location:  bilateral back, post thighs      Occupation: nurse practioner  Leisure: exercise, ride bike, read      Pts goals: conservative way to manage her back pain, do exercise that doesn't hurt her back  Objective     Baseline IM Testing Results:     Baseline Isometric Testing on Med X equipment: Testing administered by PT  Date of testin22  ROM  0-45 deg   Max Peak Torque  131   Min Peak Torque  42   Flex/Ext Ratio 3:1   % below normative data 42     Treatment    Pt was instructed in and performed the following:     Natali received therapeutic exercises to develop/improved posture, cardiovascular endurance, muscular endurance, lumbar/cervical ROM, strength and muscular endurance for 48 minutes  including the following exercises:     Single KTC 5x 5 sec ea  Bilateral KTC 5 sec x5  Posterior pelvic tilt x20  Seated lumbar flexion 5x 5 sec  Lumbar stabilization supine march in place x20   Lumbar stabilization leg extension 2/10 ea    [facility] 9/28/2022   Visit Number 8   VAS Pain Rating 3   Time -   Flexion in Lying 5   Flexion in Sitting 5   Lumbar Extension Seat Pad -   Femur Restraint -   Top Dead Center -   Counterweight -   Lumbar Flexion -   Lumbar Extension -   Lumbar Peak Torque -   Min Torque -   Test Percent Below Normative Data -   Lumbar Weight 68   Repetitions 20   Rating of Perceived Exertion 4   Ice - Z Lie (in min.) 10             Peripheral muscle strengthening which included 1 set of 15-20 repetitions at a slow, controlled 10-13 second per rep pace focused on strengthening supporting musculature for improved body mechanics and functional mobility.  Pt and therapist focused on proper form during treatment to ensure optimal strengthening of each targeted muscle group.  Machines were utilized including torso rotation, leg extension, leg curl, chest press, upright row. Tricep extension, bicep curl, leg press, and hip abduction added visit 3    Natali received the following manual therapy techniques: n/a were applied to the: n/a for 00 minutes.         Home Exercises Provided and Patient Education Provided   Home exercises include:  Single KTC 5x 5 sec ea  Bilateral KTC 5 sec x5  Posterior pelvic tilt x20  Seated lumbar flexion 5x 5 sec    Cardio program:9/19/22 [place] at home  Lifting education date:  Posture/Lumbar roll:     Education provided:   - Educated pt on the importance of daily stretch to increase the benefit of therapy and positive results.      Written Home Exercises Provided: Patient instructed to cont prior HEP.  Exercises were reviewed and Justin was able to demonstrate them prior to the end of the session.  Justin demonstrated good  understanding of the education  provided.     See EMR under Patient Instructions for exercises provided prior visit.          Assessment     Improved perceived exertion with most exercises today indicating improving strength. Much improved core control and lumbar stabilization. Discussed post pelvic tilt periodically while standing in kitchen, as well as putting one foot up on stool or inside ledge of cabinet.  Patient is making good progress towards established goals.  Pt will continue to benefit from skilled outpatient physical therapy to address the deficits stated in the impairment chart, provide pt/family education and to maximize pt's level of independence in the home and community environment.     Anticipated Barriers for therapy: none  Pt's spiritual, cultural and educational needs considered and pt agreeable to plan of care and goals as stated below:       GOALS: Pt is in agreement with the following goals.     Short term goals:  6 weeks or 10 visits   1.  Pt will demonstrate increased lumbar ROM by at least 3 degrees from the initial ROM value with improvements noted in functional ROM and ability to perform ADLs.  (approp and ongoing)  2.  Pt will demonstrate increased MedX average isometric strength value  by 20% from initial test resulting in improved ability to perform bending, lifting, and carrying activities safely, confidently.  (approp and ongoing)  3.  Patient report a reduction in worst pain score by 1-2 points for improved tolerance for standing, prolonged sitting and exercise.  (approp and ongoing)  4.  Pt able to perform HEP correctly with minimal cueing or supervision from therapist to encourage independent management of symptoms. (approp and ongoing)        Long term goals: 10 weeks or 20 visits   1. Pt will demonstrate increased lumbar ROM by at least 6 degrees from initial ROM value, resulting in improved ability to perform functional fwd bending while standing and sitting. (approp and ongoing)  2. Pt will demonstrate  increased MedX average isometric strength value  by 30% from initial test resulting in improved ability to perform bending, lifting, and carrying activities safely, confidently.  (approp and ongoing)  3. Pt to demonstrate ability to independently control and reduce their pain through posture positioning and mechanical movements throughout a typical day.  (approp and ongoing)  4.  Pt will demonstrate reduced pain and improved functional outcomes as reported on the FOTO by reaching a limitation score of < or = 28% or less in order to demonstrate subjective improvement in pt's condition.    (approp and ongoing)  5. Pt will demonstrate independence with the HEP at discharge  (approp and ongoing)  6.  Pt will understand and apply conservative way to manage her back pain, do exercise that doesn't hurt her back(patient goal)  (approp and ongoing    Plan   Continue with established Plan of Care towards established PT goals.

## 2022-10-03 ENCOUNTER — CLINICAL SUPPORT (OUTPATIENT)
Dept: REHABILITATION | Facility: HOSPITAL | Age: 47
End: 2022-10-03
Payer: COMMERCIAL

## 2022-10-03 DIAGNOSIS — G89.29 CHRONIC BILATERAL LOW BACK PAIN WITHOUT SCIATICA: Primary | ICD-10-CM

## 2022-10-03 DIAGNOSIS — M54.50 CHRONIC BILATERAL LOW BACK PAIN WITHOUT SCIATICA: Primary | ICD-10-CM

## 2022-10-03 DIAGNOSIS — R29.898 DECREASED STRENGTH OF TRUNK AND BACK: ICD-10-CM

## 2022-10-03 PROCEDURE — 97110 THERAPEUTIC EXERCISES: CPT | Mod: PO | Performed by: PHYSICAL THERAPIST

## 2022-10-03 PROCEDURE — 97750 PHYSICAL PERFORMANCE TEST: CPT | Mod: 32,PO | Performed by: PHYSICAL THERAPIST

## 2022-10-03 NOTE — PROGRESS NOTES
[facility]      Name: Natali Tubbs  Clinic Number: [id number]    Therapy Diagnosis:   Encounter Diagnoses   Name Primary?    Chronic bilateral low back pain without sciatica Yes    Decreased strength of trunk and back          Physician: Sharmin Aguirre PA-C    Visit Date: 10/3/2022    Physician: Sharmin Aguirre PA-C     Physician Orders: PT Eval and Treat [facility]  Medical Diagnosis from Referral: Chronic bilateral low back pain  Evaluation Date: 2022  Authorization Period Expiration: 23  Plan of Care Expiration: 22  Reassessment Due: 10th visit  Visit # / Visits authorized:      Time In: 3:15PM  Time Out: 4:15PM  Total Billable Time:  50 minutes  Insurance:Fee for service Insurance Patient        Precautions: Standard     Pattern of pain determined: Pattern 2  Subjective    Natali reports she less pain today, but did not do her usual cooking yesterday for week. She will be doing that this evening.  Patient reports tolerating previous visit was well with general soreness.  Patient reports their pain to be  2 /10 on a 0-10 scale with 0 being no pain and 10 being the worst pain imaginable.  Pain Location:  bilateral back, post thighs      Occupation: nurse practioner  Leisure: exercise, ride bike, read      Pts goals: conservative way to manage her back pain, do exercise that doesn't hurt her back  Objective     Baseline IM Testing Results:     Baseline Isometric Testing on Med X equipment: Testing administered by PT  Date of testin22  ROM  0-45 deg   Max Peak Torque  131   Min Peak Torque  42   Flex/Ext Ratio 3:1   % below normative data 42     Treatment    Pt was instructed in and performed the following:     Natali received therapeutic exercises to develop/improved posture, cardiovascular endurance, muscular endurance, lumbar/cervical ROM, strength and muscular endurance for 50 minutes including the following exercises:     Single  KTC 5x 5 sec ea  Bilateral KTC 5 sec x5  Posterior pelvic tilt x20  Seated lumbar flexion 5x 5 sec  Lumbar stabilization supine march in place x20   Lumbar stabilization leg extension 2/10 ea    [facility] 10/3/2022   Visit Number 9   VAS Pain Rating 2   Time -   Flexion in Lying 5   Flexion in Sitting 5   Lumbar Extension Seat Pad -   Femur Restraint -   Top Dead Center -   Counterweight -   Lumbar Flexion -   Lumbar Extension -   Lumbar Peak Torque -   Min Torque -   Test Percent Below Normative Data -   Lumbar Weight 68   Repetitions 20   Rating of Perceived Exertion 4   Ice - Z Lie (in min.) 10                 Peripheral muscle strengthening which included 1 set of 15-20 repetitions at a slow, controlled 10-13 second per rep pace focused on strengthening supporting musculature for improved body mechanics and functional mobility.  Pt and therapist focused on proper form during treatment to ensure optimal strengthening of each targeted muscle group.  Machines were utilized including torso rotation, leg extension, leg curl, chest press, upright row. Tricep extension, bicep curl, leg press, and hip abduction added visit 3    Natali received the following manual therapy techniques: n/a were applied to the: n/a for 00 minutes.         Home Exercises Provided and Patient Education Provided   Home exercises include:  Single KTC 5x 5 sec ea  Bilateral KTC 5 sec x5  Posterior pelvic tilt x20  Seated lumbar flexion 5x 5 sec    Cardio program:9/19/22 [place] at home  Lifting education date:  Posture/Lumbar roll:     Education provided:   - Educated pt on the importance of daily stretch to increase the benefit of therapy and positive results.      Written Home Exercises Provided: Patient instructed to cont prior HEP.  Exercises were reviewed and Justin was able to demonstrate them prior to the end of the session.  Justin demonstrated good  understanding of the education provided.     See EMR under Patient  Instructions for exercises provided prior visit.          Assessment     Instructed to focus on abdominal bracing, putting foot on step or stopping periodically and doing seated flexion stretch with cooking this evening. Much improved core control with exercise.  Patient is making good progress towards established goals.  Pt will continue to benefit from skilled outpatient physical therapy to address the deficits stated in the impairment chart, provide pt/family education and to maximize pt's level of independence in the home and community environment.     Anticipated Barriers for therapy: none  Pt's spiritual, cultural and educational needs considered and pt agreeable to plan of care and goals as stated below:       GOALS: Pt is in agreement with the following goals.     Short term goals:  6 weeks or 10 visits   1.  Pt will demonstrate increased lumbar ROM by at least 3 degrees from the initial ROM value with improvements noted in functional ROM and ability to perform ADLs.  (approp and ongoing)  2.  Pt will demonstrate increased MedX average isometric strength value  by 20% from initial test resulting in improved ability to perform bending, lifting, and carrying activities safely, confidently.  (approp and ongoing)  3.  Patient report a reduction in worst pain score by 1-2 points for improved tolerance for standing, prolonged sitting and exercise.  (approp and ongoing)  4.  Pt able to perform HEP correctly with minimal cueing or supervision from therapist to encourage independent management of symptoms. (approp and ongoing)        Long term goals: 10 weeks or 20 visits   1. Pt will demonstrate increased lumbar ROM by at least 6 degrees from initial ROM value, resulting in improved ability to perform functional fwd bending while standing and sitting. (approp and ongoing)  2. Pt will demonstrate increased MedX average isometric strength value  by 30% from initial test resulting in improved ability to perform  bending, lifting, and carrying activities safely, confidently.  (approp and ongoing)  3. Pt to demonstrate ability to independently control and reduce their pain through posture positioning and mechanical movements throughout a typical day.  (approp and ongoing)  4.  Pt will demonstrate reduced pain and improved functional outcomes as reported on the FOTO by reaching a limitation score of < or = 28% or less in order to demonstrate subjective improvement in pt's condition.    (approp and ongoing)  5. Pt will demonstrate independence with the HEP at discharge  (approp and ongoing)  6.  Pt will understand and apply conservative way to manage her back pain, do exercise that doesn't hurt her back(patient goal)  (approp and ongoing    Plan   Continue with established Plan of Care towards established PT goals.

## 2022-10-04 ENCOUNTER — PATIENT MESSAGE (OUTPATIENT)
Dept: REHABILITATION | Facility: HOSPITAL | Age: 47
End: 2022-10-04
Payer: COMMERCIAL

## 2022-10-05 ENCOUNTER — CLINICAL SUPPORT (OUTPATIENT)
Dept: REHABILITATION | Facility: HOSPITAL | Age: 47
End: 2022-10-05
Payer: COMMERCIAL

## 2022-10-05 DIAGNOSIS — R29.898 DECREASED STRENGTH OF TRUNK AND BACK: ICD-10-CM

## 2022-10-05 DIAGNOSIS — M54.50 CHRONIC BILATERAL LOW BACK PAIN WITHOUT SCIATICA: Primary | ICD-10-CM

## 2022-10-05 DIAGNOSIS — G89.29 CHRONIC BILATERAL LOW BACK PAIN WITHOUT SCIATICA: Primary | ICD-10-CM

## 2022-10-05 PROCEDURE — 97110 THERAPEUTIC EXERCISES: CPT | Mod: PO | Performed by: PHYSICAL THERAPIST

## 2022-10-05 PROCEDURE — 97750 PHYSICAL PERFORMANCE TEST: CPT | Mod: PO | Performed by: PHYSICAL THERAPIST

## 2022-10-05 NOTE — PROGRESS NOTES
[facility]      Name: Natali Tubbs  Clinic Number: [id number]    Therapy Diagnosis:   Encounter Diagnoses   Name Primary?    Chronic bilateral low back pain without sciatica Yes    Decreased strength of trunk and back            Physician: Sharmin Aguirre PA-C    Visit Date: 10/5/2022    Physician: Sharmin Aguirre PA-C     Physician Orders: PT Eval and Treat [facility]  Medical Diagnosis from Referral: Chronic bilateral low back pain  Evaluation Date: 8/31/2022  Authorization Period Expiration: 8/23/23  Plan of Care Expiration: 12/1/22  Reassessment 10/5/22  Reassessment Due: 20th visit  Visit # / Visits authorized: 10/ 20     Time In: 3:15PM  Time Out: 4:15PM  Total Billable Time:  50 minutes  Insurance:Fee for service Insurance Patient        Precautions: Standard     Pattern of pain determined: Pattern 2  Subjective    Natali reports she does feel like she has improved, stating pain is not as severe and she is able to reduce it quicker, so it doesn't last as long or get as intense. She did better with extended standing with cooking this week.  Patient reports tolerating previous visit was well with general soreness.  Patient reports their pain to be  4 /10 on a 0-10 scale with 0 being no pain and 10 being the worst pain imaginable.  Pain Location:  bilateral back, post thighs      Occupation: nurse practioner  Leisure: exercise, ride bike, read      Pts goals: conservative way to manage her back pain, do exercise that doesn't hurt her back  Objective     Baseline IM Testing Results:     Baseline Isometric Testing on Med X equipment: Testing administered by PT  Date of testing:                 10/5/22               8/31/22  ROM 0-51 deg  0-45 deg   Max Peak Torque 280  131   Min Peak Torque 55  42   Flex/Ext Ratio 5:1 3:1   % below normative data  42   % above normative data 30          Outcomes:  Initial visit: 33%  Visit 5:33%  Visit 10: 33%    Treatment    Pt  was instructed in and performed the following:     Natali received therapeutic exercises to develop/improved posture, cardiovascular endurance, muscular endurance, lumbar/cervical ROM, strength and muscular endurance for 50 minutes including the following exercises:     Single KTC 5x 5 sec ea  Bilateral KTC 5 sec x5  Posterior pelvic tilt x20  Seated lumbar flexion 5x 5 sec  Lumbar stabilization supine march in place x20   Lumbar stabilization leg extension 2/10 ea    [facility] 10/5/2022   Visit Number 10   VAS Pain Rating 4   Time -   Flexion in Lying 5   Flexion in Sitting 5   Lumbar Extension Seat Pad -   Femur Restraint -   Top Dead Center -   Counterweight -   Lumbar Flexion 51   Lumbar Extension 0   Lumbar Peak Torque 280   Min Torque 55   Test Percent Below Normative Data -   Test Percent Gain in Strength from Initial  101   Lumbar Weight -   Repetitions -   Rating of Perceived Exertion -   Ice - Z Lie (in min.) 10         Peripheral muscle strengthening which included 1 set of 15-20 repetitions at a slow, controlled 10-13 second per rep pace focused on strengthening supporting musculature for improved body mechanics and functional mobility.  Pt and therapist focused on proper form during treatment to ensure optimal strengthening of each targeted muscle group.  Machines were utilized including torso rotation, leg extension, leg curl, chest press, upright row. Tricep extension, bicep curl, leg press, and hip abduction added visit 3    Natali received the following manual therapy techniques: n/a were applied to the: n/a for 00 minutes.         Home Exercises Provided and Patient Education Provided   Home exercises include:  Single KTC 5x 5 sec ea  Bilateral KTC 5 sec x5  Posterior pelvic tilt x20  Seated lumbar flexion 5x 5 sec    Cardio program:9/19/22 [place] at home  Lifting education date:  Posture/Lumbar roll:  visit 1    Education provided:   - Educated pt on the importance of daily stretch to  increase the benefit of therapy and positive results.      Written Home Exercises Provided: Patient instructed to cont prior HEP.  Exercises were reviewed and Justin was able to demonstrate them prior to the end of the session.  Justin demonstrated good  understanding of the education provided.     See EMR under Patient Instructions for exercises provided prior visit.          Assessment     Reassessment 10/5/22:  Patient has attended 10 visits at [facility] which included MD evaluation, PT evaluation with isometric testing, and physical therapy treatment including HEP instruction, education, aerobic work, dynamic strengthening on med ex equipment for the spine, and whole body strengthening on med ex equipment with increasing weight loads.  Patient  is demonstrating increased ability to reduce symptoms, improved posture, improved   ROM, and improved   strength as follows:  Improvement in strength, ROM, pain and function.  -Improved posture,   focusing on abdominal bracing   -Improved lumbar ROM,  initially on med ex test 0-45 degrees and   currently 0-51 degrees  -Improved strength at each test point on lumbar med ex IM test with   101% average improvement noted with Reduced pain  Noted by patient      Patient is making good progress towards established goals.  Pt will continue to benefit from skilled outpatient physical therapy to address the deficits stated in the impairment chart, provide pt/family education and to maximize pt's level of independence in the home and community environment.     Anticipated Barriers for therapy: none  Pt's spiritual, cultural and educational needs considered and pt agreeable to plan of care and goals as stated below:       GOALS: Pt is in agreement with the following goals.     Short term goals:  6 weeks or 10 visits   1.  Pt will demonstrate increased lumbar ROM by at least 3 degrees from the initial ROM value with improvements noted in functional ROM and ability to  perform ADLs.  (approp and ongoing)Met 10/5/22  2.  Pt will demonstrate increased MedX average isometric strength value  by 20% from initial test resulting in improved ability to perform bending, lifting, and carrying activities safely, confidently.  (approp and ongoing)Met 10/5/22  3.  Patient report a reduction in worst pain score by 1-2 points for improved tolerance for standing, prolonged sitting and exercise.  (approp and ongoing)Progressing  4.  Pt able to perform HEP correctly with minimal cueing or supervision from therapist to encourage independent management of symptoms. (approp and ongoing)Met 10/5/22        Long term goals: 10 weeks or 20 visits   1. Pt will demonstrate increased lumbar ROM by at least 6 degrees from initial ROM value, resulting in improved ability to perform functional fwd bending while standing and sitting. (approp and ongoing)Met 10/5/22  2. Pt will demonstrate increased MedX average isometric strength value  by 30% from initial test resulting in improved ability to perform bending, lifting, and carrying activities safely, confidently.  (approp and ongoing)Met 10/5/22  3. Pt to demonstrate ability to independently control and reduce their pain through posture positioning and mechanical movements throughout a typical day.  (approp and ongoing)Met 10/5/22  4.  Pt will demonstrate reduced pain and improved functional outcomes as reported on the FOTO by reaching a limitation score of < or = 28% or less in order to demonstrate subjective improvement in pt's condition. (approp and ongoing)Not met  5. Pt will demonstrate independence with the HEP at discharge  (approp and ongoing)  6.  Pt will understand and apply conservative way to manage her back pain, do exercise that doesn't hurt her back(patient goal)  (approp and ongoing    Plan   Continue with established Plan of Care towards established PT goals.

## 2022-10-13 ENCOUNTER — CLINICAL SUPPORT (OUTPATIENT)
Dept: REHABILITATION | Facility: HOSPITAL | Age: 47
End: 2022-10-13
Payer: COMMERCIAL

## 2022-10-13 DIAGNOSIS — G89.29 CHRONIC BILATERAL LOW BACK PAIN WITHOUT SCIATICA: Primary | ICD-10-CM

## 2022-10-13 DIAGNOSIS — R29.898 DECREASED STRENGTH OF TRUNK AND BACK: ICD-10-CM

## 2022-10-13 DIAGNOSIS — M54.50 CHRONIC BILATERAL LOW BACK PAIN WITHOUT SCIATICA: Primary | ICD-10-CM

## 2022-10-13 PROCEDURE — 97110 THERAPEUTIC EXERCISES: CPT | Mod: PO | Performed by: PHYSICAL THERAPIST

## 2022-10-13 NOTE — PROGRESS NOTES
[facility]      Name: Natali Tubbs  Clinic Number: [id number]    Therapy Diagnosis:   Encounter Diagnoses   Name Primary?    Chronic bilateral low back pain without sciatica Yes    Decreased strength of trunk and back            Physician: Sharmin Aguirre PA-C    Visit Date: 10/13/2022    Physician: Sharmin Aguirre PA-C     Physician Orders: PT Eval and Treat [facility]  Medical Diagnosis from Referral: Chronic bilateral low back pain  Evaluation Date: 8/31/2022  Authorization Period Expiration: 8/23/23  Plan of Care Expiration: 12/1/22  Reassessment 10/5/22  Reassessment Due: 20th visit  Visit # / Visits authorized: 11/ 20     Time In: 7:13AM  Time Out: 8:13AM  Total Billable Time:  52 minutes  Insurance:Fee for service Insurance Patient        Precautions: Standard     Pattern of pain determined: Pattern 2  Subjective    Natali reports her pain is worse the last few days. Stretching helps, but does not relieve sx.  Patient reports tolerating previous visit was well with general soreness.  Patient reports their pain to be  8 /10 on a 0-10 scale with 0 being no pain and 10 being the worst pain imaginable.  Pain Location:  bilateral back, post thighs      Occupation: nurse practioner  Leisure: exercise, ride bike, read      Pts goals: conservative way to manage her back pain, do exercise that doesn't hurt her back  Objective     Baseline IM Testing Results:     Baseline Isometric Testing on Med X equipment: Testing administered by PT  Date of testing:                 10/5/22               8/31/22  ROM 0-51 deg  0-45 deg   Max Peak Torque 280  131   Min Peak Torque 55  42   Flex/Ext Ratio 5:1 3:1   % below normative data  42   % above normative data 30          Outcomes:  Initial visit: 33%  Visit 5:33%  Visit 10: 33%    Treatment    Pt was instructed in and performed the following:     Natali received therapeutic exercises to develop/improved posture,  cardiovascular endurance, muscular endurance, lumbar/cervical ROM, strength and muscular endurance for 50 minutes including the following exercises:     Single KTC 5x 5 sec ea  Bilateral KTC 5 sec x5  Posterior pelvic tilt x20  Seated lumbar flexion 5x 5 sec  Lumbar stabilization supine march in place x20   Lumbar stabilization leg extension 2/10 ea    [facility] 10/13/2022   Visit Number 11   VAS Pain Rating 8   Time -   Flexion in Lying 5   Flexion in Sitting 5   Lumbar Extension Seat Pad -   Femur Restraint -   Top Dead Center -   Counterweight -   Lumbar Flexion -   Lumbar Extension -   Lumbar Peak Torque -   Min Torque -   Test Percent Below Normative Data -   Test Percent Gain in Strength from Initial  -   Lumbar Weight 71   Repetitions 15   Rating of Perceived Exertion 4   Ice - Z Lie (in min.) 10             Peripheral muscle strengthening which included 1 set of 15-20 repetitions at a slow, controlled 10-13 second per rep pace focused on strengthening supporting musculature for improved body mechanics and functional mobility.  Pt and therapist focused on proper form during treatment to ensure optimal strengthening of each targeted muscle group.  Machines were utilized including torso rotation, leg extension, leg curl, chest press, upright row. Tricep extension, bicep curl, leg press, and hip abduction added visit 3    Natali received the following manual therapy techniques: n/a were applied to the: n/a for 00 minutes.         Home Exercises Provided and Patient Education Provided   Home exercises include:  Single KTC 5x 5 sec ea  Bilateral KTC 5 sec x5  Posterior pelvic tilt x20  Seated lumbar flexion 5x 5 sec    Cardio program:9/19/22 [place] at home  Lifting education date: 10/13/22  Posture/Lumbar roll:  visit 1    Education provided:   - Educated pt on the importance of daily stretch to increase the benefit of therapy and positive results.      Written Home Exercises Provided: Patient  instructed to cont prior HEP.  Exercises were reviewed and Justin was able to demonstrate them prior to the end of the session.  Justin demonstrated good  understanding of the education provided.     See EMR under Patient Instructions for exercises provided prior visit.          Assessment     Instructed pt in Do's and Don'ts of lifting. She reports she did not have increased pain after testing last visit. She had increased pain earlier this week and has had difficulty reducing it. Ice and stretching do help. Progressed with resistance on MEDX and torso rotation with same perceived exertion.    Reassessment 10/5/22:  Patient has attended 10 visits at [facility] which included MD evaluation, PT evaluation with isometric testing, and physical therapy treatment including HEP instruction, education, aerobic work, dynamic strengthening on med ex equipment for the spine, and whole body strengthening on med ex equipment with increasing weight loads.  Patient  is demonstrating increased ability to reduce symptoms, improved posture, improved   ROM, and improved   strength as follows:  Improvement in strength, ROM, pain and function.  -Improved posture,   focusing on abdominal bracing   -Improved lumbar ROM,  initially on med ex test 0-45 degrees and   currently 0-51 degrees  -Improved strength at each test point on lumbar med ex IM test with   101% average improvement noted with Reduced pain  Noted by patient      Patient is making good progress towards established goals.  Pt will continue to benefit from skilled outpatient physical therapy to address the deficits stated in the impairment chart, provide pt/family education and to maximize pt's level of independence in the home and community environment.     Anticipated Barriers for therapy: none  Pt's spiritual, cultural and educational needs considered and pt agreeable to plan of care and goals as stated below:       GOALS: Pt is in agreement with the following  goals.     Short term goals:  6 weeks or 10 visits   1.  Pt will demonstrate increased lumbar ROM by at least 3 degrees from the initial ROM value with improvements noted in functional ROM and ability to perform ADLs.  (approp and ongoing)Met 10/5/22  2.  Pt will demonstrate increased MedX average isometric strength value  by 20% from initial test resulting in improved ability to perform bending, lifting, and carrying activities safely, confidently.  (approp and ongoing)Met 10/5/22  3.  Patient report a reduction in worst pain score by 1-2 points for improved tolerance for standing, prolonged sitting and exercise.  (approp and ongoing)Progressing  4.  Pt able to perform HEP correctly with minimal cueing or supervision from therapist to encourage independent management of symptoms. (approp and ongoing)Met 10/5/22        Long term goals: 10 weeks or 20 visits   1. Pt will demonstrate increased lumbar ROM by at least 6 degrees from initial ROM value, resulting in improved ability to perform functional fwd bending while standing and sitting. (approp and ongoing)Met 10/5/22  2. Pt will demonstrate increased MedX average isometric strength value  by 30% from initial test resulting in improved ability to perform bending, lifting, and carrying activities safely, confidently.  (approp and ongoing)Met 10/5/22  3. Pt to demonstrate ability to independently control and reduce their pain through posture positioning and mechanical movements throughout a typical day.  (approp and ongoing)Met 10/5/22  4.  Pt will demonstrate reduced pain and improved functional outcomes as reported on the FOTO by reaching a limitation score of < or = 28% or less in order to demonstrate subjective improvement in pt's condition. (approp and ongoing)Not met  5. Pt will demonstrate independence with the HEP at discharge  (approp and ongoing)  6.  Pt will understand and apply conservative way to manage her back pain, do exercise that doesn't hurt her  back(patient goal)  (approp and ongoing    Plan   Continue with established Plan of Care towards established PT goals.

## 2022-10-24 ENCOUNTER — PATIENT MESSAGE (OUTPATIENT)
Dept: REHABILITATION | Facility: HOSPITAL | Age: 47
End: 2022-10-24
Payer: COMMERCIAL

## 2022-10-25 ENCOUNTER — CLINICAL SUPPORT (OUTPATIENT)
Dept: REHABILITATION | Facility: HOSPITAL | Age: 47
End: 2022-10-25
Payer: COMMERCIAL

## 2022-10-25 DIAGNOSIS — R29.898 DECREASED STRENGTH OF TRUNK AND BACK: ICD-10-CM

## 2022-10-25 DIAGNOSIS — M54.50 CHRONIC BILATERAL LOW BACK PAIN WITHOUT SCIATICA: Primary | ICD-10-CM

## 2022-10-25 DIAGNOSIS — G89.29 CHRONIC BILATERAL LOW BACK PAIN WITHOUT SCIATICA: Primary | ICD-10-CM

## 2022-10-25 PROCEDURE — 97110 THERAPEUTIC EXERCISES: CPT | Mod: PO,CQ

## 2022-10-25 NOTE — PROGRESS NOTES
[facility]      Name: Natali Tubbs  Clinic Number: [id number]    Therapy Diagnosis:   Encounter Diagnoses   Name Primary?    Chronic bilateral low back pain without sciatica Yes    Decreased strength of trunk and back              Physician: Sharmin Aguirre PA-C    Visit Date: 10/25/2022    Physician: Sharmin Aguirre PA-C     Physician Orders: PT Eval and Treat [facility]  Medical Diagnosis from Referral: Chronic bilateral low back pain  Evaluation Date: 8/31/2022  Authorization Period Expiration: 8/23/23  Plan of Care Expiration: 12/1/22  Reassessment 10/5/22  Reassessment Due: 20th visit  Visit # / Visits authorized: 12/ 20     Time In: 7:03 AM  Time Out: 8:03 AM  Total Billable Time:  45 minutes  Insurance:Fee for service Insurance Patient        Precautions: Standard     Pattern of pain determined: Pattern 2  Subjective    Natali reports her pain is worse the last few days due to working sat and Sunday. Stretching helps, but does not relieve sx.  Patient reports tolerating previous visit was well with general soreness.  Patient reports their pain to be  8 /10 on a 0-10 scale with 0 being no pain and 10 being the worst pain imaginable.  Pain Location:  bilateral back, post thighs      Occupation: nurse practioner  Leisure: exercise, ride bike, read      Pts goals: conservative way to manage her back pain, do exercise that doesn't hurt her back  Objective     Baseline IM Testing Results:     Baseline Isometric Testing on Med X equipment: Testing administered by PT  Date of testing:                 10/5/22               8/31/22  ROM 0-51 deg  0-45 deg   Max Peak Torque 280  131   Min Peak Torque 55  42   Flex/Ext Ratio 5:1 3:1   % below normative data  42   % above normative data 30          Outcomes:  Initial visit: 33%  Visit 5:33%  Visit 10: 33%    Treatment    Pt was instructed in and performed the following:     Natali received therapeutic exercises to  develop/improved posture, cardiovascular endurance, muscular endurance, lumbar/cervical ROM, strength and muscular endurance for 60 minutes including the following exercises:     Single KTC 5x 5 sec ea  Bilateral KTC 5 sec x5  Posterior pelvic tilt x20  Seated lumbar flexion 5x 5 sec  Lumbar stabilization supine march in place x20   Lumbar stabilization leg extension 2/10 ea    [facility] 10/25/2022   Visit Number 12   VAS Pain Rating 8   Time -   Flexion in Lying 5   Flexion in Sitting 5   Lumbar Extension Seat Pad -   Femur Restraint -   Top Dead Center -   Counterweight -   Lumbar Flexion -   Lumbar Extension -   Lumbar Peak Torque -   Min Torque -   Test Percent Below Normative Data -   Test Percent Gain in Strength from Initial  -   Lumbar Weight 71   Repetitions 18   Rating of Perceived Exertion 4   Ice - Z Lie (in min.) 10            Peripheral muscle strengthening which included 1 set of 15-20 repetitions at a slow, controlled 10-13 second per rep pace focused on strengthening supporting musculature for improved body mechanics and functional mobility.  Pt and therapist focused on proper form during treatment to ensure optimal strengthening of each targeted muscle group.  Machines were utilized including torso rotation, leg extension, leg curl, chest press, upright row. Tricep extension, bicep curl, leg press, and hip abduction added visit 3    Natali received the following manual therapy techniques: n/a were applied to the: n/a for 00 minutes.         Home Exercises Provided and Patient Education Provided   Home exercises include:  Single KTC 5x 5 sec ea  Bilateral KTC 5 sec x5  Posterior pelvic tilt x20  Seated lumbar flexion 5x 5 sec    Cardio program:9/19/22 [place] at home  Lifting education date: 10/13/22  Posture/Lumbar roll:  visit 1    Education provided:   - Educated pt on the importance of daily stretch to increase the benefit of therapy and positive results.      Written Home Exercises  Provided: Patient instructed to cont prior HEP.  Exercises were reviewed and Justin was able to demonstrate them prior to the end of the session.  Justin demonstrated good  understanding of the education provided.     See EMR under Patient Instructions for exercises provided prior visit.          Assessment     Pt with a little less pain post session. Ice and stretching do help. Progressed with more reps  on MEDX and torso rotation with same perceived exertion.    Reassessment 10/5/22:  Patient has attended 10 visits at [facility] which included MD evaluation, PT evaluation with isometric testing, and physical therapy treatment including HEP instruction, education, aerobic work, dynamic strengthening on med ex equipment for the spine, and whole body strengthening on med ex equipment with increasing weight loads.  Patient  is demonstrating increased ability to reduce symptoms, improved posture, improved   ROM, and improved   strength as follows:  Improvement in strength, ROM, pain and function.  -Improved posture,   focusing on abdominal bracing   -Improved lumbar ROM,  initially on med ex test 0-45 degrees and   currently 0-51 degrees  -Improved strength at each test point on lumbar med ex IM test with   101% average improvement noted with Reduced pain  Noted by patient      Patient is making good progress towards established goals.  Pt will continue to benefit from skilled outpatient physical therapy to address the deficits stated in the impairment chart, provide pt/family education and to maximize pt's level of independence in the home and community environment.     Anticipated Barriers for therapy: none  Pt's spiritual, cultural and educational needs considered and pt agreeable to plan of care and goals as stated below:       GOALS: Pt is in agreement with the following goals.     Short term goals:  6 weeks or 10 visits   1.  Pt will demonstrate increased lumbar ROM by at least 3 degrees from the  initial ROM value with improvements noted in functional ROM and ability to perform ADLs.  (approp and ongoing)Met 10/5/22  2.  Pt will demonstrate increased MedX average isometric strength value  by 20% from initial test resulting in improved ability to perform bending, lifting, and carrying activities safely, confidently.  (approp and ongoing)Met 10/5/22  3.  Patient report a reduction in worst pain score by 1-2 points for improved tolerance for standing, prolonged sitting and exercise.  (approp and ongoing)Progressing  4.  Pt able to perform HEP correctly with minimal cueing or supervision from therapist to encourage independent management of symptoms. (approp and ongoing)Met 10/5/22        Long term goals: 10 weeks or 20 visits   1. Pt will demonstrate increased lumbar ROM by at least 6 degrees from initial ROM value, resulting in improved ability to perform functional fwd bending while standing and sitting. (approp and ongoing)Met 10/5/22  2. Pt will demonstrate increased MedX average isometric strength value  by 30% from initial test resulting in improved ability to perform bending, lifting, and carrying activities safely, confidently.  (approp and ongoing)Met 10/5/22  3. Pt to demonstrate ability to independently control and reduce their pain through posture positioning and mechanical movements throughout a typical day.  (approp and ongoing)Met 10/5/22  4.  Pt will demonstrate reduced pain and improved functional outcomes as reported on the FOTO by reaching a limitation score of < or = 28% or less in order to demonstrate subjective improvement in pt's condition. (approp and ongoing)Not met  5. Pt will demonstrate independence with the HEP at discharge  (approp and ongoing)  6.  Pt will understand and apply conservative way to manage her back pain, do exercise that doesn't hurt her back(patient goal)  (approp and ongoing    Plan   Continue with established Plan of Care towards established PT goals.

## 2022-10-27 ENCOUNTER — CLINICAL SUPPORT (OUTPATIENT)
Dept: REHABILITATION | Facility: HOSPITAL | Age: 47
End: 2022-10-27
Payer: COMMERCIAL

## 2022-10-27 DIAGNOSIS — M54.50 CHRONIC BILATERAL LOW BACK PAIN WITHOUT SCIATICA: Primary | ICD-10-CM

## 2022-10-27 DIAGNOSIS — R29.898 DECREASED STRENGTH OF TRUNK AND BACK: ICD-10-CM

## 2022-10-27 DIAGNOSIS — G89.29 CHRONIC BILATERAL LOW BACK PAIN WITHOUT SCIATICA: Primary | ICD-10-CM

## 2022-10-27 PROCEDURE — 97110 THERAPEUTIC EXERCISES: CPT | Mod: PO | Performed by: PHYSICAL THERAPIST

## 2022-10-27 NOTE — PROGRESS NOTES
[facility]      Name: Natali Tubbs  Clinic Number: [id number]    Therapy Diagnosis:   Encounter Diagnoses   Name Primary?    Chronic bilateral low back pain without sciatica Yes    Decreased strength of trunk and back              Physician: Sharmin Aguirre PA-C    Visit Date: 10/27/2022    Physician: Sharmin Aguirre PA-C     Physician Orders: PT Eval and Treat [facility]  Medical Diagnosis from Referral: Chronic bilateral low back pain  Evaluation Date: 8/31/2022  Authorization Period Expiration: 8/23/23  Plan of Care Expiration: 12/1/22  Reassessment 10/5/22  Reassessment Due: 20th visit  Visit # / Visits authorized: 13/ 20     Time In: 7:15AM  Time Out: 8:00 AM  Total Billable Time:  45 minutes  Insurance:Fee for service Insurance Patient        Precautions: Standard     Pattern of pain determined: Pattern 2  Subjective    Natali reports her pain is less this morning, even after watching grandchildren yesterday.  Patient reports tolerating previous visit was well with general soreness.  Patient reports their pain to be  2 /10 on a 0-10 scale with 0 being no pain and 10 being the worst pain imaginable.  Pain Location:  bilateral back, post thighs      Occupation: nurse practioner  Leisure: exercise, ride bike, read      Pts goals: conservative way to manage her back pain, do exercise that doesn't hurt her back  Objective     Baseline IM Testing Results:     Baseline Isometric Testing on Med X equipment: Testing administered by PT  Date of testing:                 10/5/22               8/31/22  ROM 0-51 deg  0-45 deg   Max Peak Torque 280  131   Min Peak Torque 55  42   Flex/Ext Ratio 5:1 3:1   % below normative data  42   % above normative data 30          Outcomes:  Initial visit: 33%  Visit 5:33%  Visit 10: 33%    Treatment    Pt was instructed in and performed the following:     Natali received therapeutic exercises to develop/improved posture,  cardiovascular endurance, muscular endurance, lumbar/cervical ROM, strength and muscular endurance for 45 minutes including the following exercises:     Single KTC 5x 5 sec ea  Bilateral KTC 5 sec x5  Posterior pelvic tilt x20  Seated lumbar flexion 5x 5 sec  Lumbar stabilization supine march in place x20   Lumbar stabilization leg extension 2/10 ea    [facility] 10/27/2022   Visit Number 13   VAS Pain Rating 9   Time -   Flexion in Lying 5   Flexion in Sitting 5   Lumbar Extension Seat Pad -   Femur Restraint -   Top Dead Center -   Counterweight -   Lumbar Flexion -   Lumbar Extension -   Lumbar Peak Torque -   Min Torque -   Test Percent Below Normative Data -   Test Percent Gain in Strength from Initial  -   Lumbar Weight 71   Repetitions 20   Rating of Perceived Exertion 4   Ice - Z Lie (in min.) 10                Peripheral muscle strengthening which included 1 set of 15-20 repetitions at a slow, controlled 10-13 second per rep pace focused on strengthening supporting musculature for improved body mechanics and functional mobility.  Pt and therapist focused on proper form during treatment to ensure optimal strengthening of each targeted muscle group.  Machines were utilized including torso rotation, leg extension, leg curl, chest press, upright row. Tricep extension, bicep curl, leg press, and hip abduction added visit 3    Natali received the following manual therapy techniques: n/a were applied to the: n/a for 00 minutes.         Home Exercises Provided and Patient Education Provided   Home exercises include:  Single KTC 5x 5 sec ea  Bilateral KTC 5 sec x5  Posterior pelvic tilt x20  Seated lumbar flexion 5x 5 sec    Cardio program:9/19/22 [place] at home  Lifting education date: 10/13/22  Posture/Lumbar roll:  visit 1    Education provided:   - Educated pt on the importance of daily stretch to increase the benefit of therapy and positive results.      Written Home Exercises Provided: Patient  instructed to cont prior HEP.  Exercises were reviewed and Justin was able to demonstrate them prior to the end of the session.  Justin demonstrated good  understanding of the education provided.     See EMR under Patient Instructions for exercises provided prior visit.          Assessment     Pt making steady progress with strength as indicated by increased resistance throughout core and extremities without pain and with appropriate perceived exertion.  Reassessment 10/5/22:  Patient has attended 10 visits at [facility] which included MD evaluation, PT evaluation with isometric testing, and physical therapy treatment including HEP instruction, education, aerobic work, dynamic strengthening on med ex equipment for the spine, and whole body strengthening on med ex equipment with increasing weight loads.  Patient  is demonstrating increased ability to reduce symptoms, improved posture, improved   ROM, and improved   strength as follows:  Improvement in strength, ROM, pain and function.  -Improved posture,   focusing on abdominal bracing   -Improved lumbar ROM,  initially on med ex test 0-45 degrees and   currently 0-51 degrees  -Improved strength at each test point on lumbar med ex IM test with   101% average improvement noted with Reduced pain  Noted by patient      Patient is making good progress towards established goals.  Pt will continue to benefit from skilled outpatient physical therapy to address the deficits stated in the impairment chart, provide pt/family education and to maximize pt's level of independence in the home and community environment.     Anticipated Barriers for therapy: none  Pt's spiritual, cultural and educational needs considered and pt agreeable to plan of care and goals as stated below:       GOALS: Pt is in agreement with the following goals.     Short term goals:  6 weeks or 10 visits   1.  Pt will demonstrate increased lumbar ROM by at least 3 degrees from the initial ROM  value with improvements noted in functional ROM and ability to perform ADLs.  (approp and ongoing)Met 10/5/22  2.  Pt will demonstrate increased MedX average isometric strength value  by 20% from initial test resulting in improved ability to perform bending, lifting, and carrying activities safely, confidently.  (approp and ongoing)Met 10/5/22  3.  Patient report a reduction in worst pain score by 1-2 points for improved tolerance for standing, prolonged sitting and exercise.  (approp and ongoing)Progressing  4.  Pt able to perform HEP correctly with minimal cueing or supervision from therapist to encourage independent management of symptoms. (approp and ongoing)Met 10/5/22        Long term goals: 10 weeks or 20 visits   1. Pt will demonstrate increased lumbar ROM by at least 6 degrees from initial ROM value, resulting in improved ability to perform functional fwd bending while standing and sitting. (approp and ongoing)Met 10/5/22  2. Pt will demonstrate increased MedX average isometric strength value  by 30% from initial test resulting in improved ability to perform bending, lifting, and carrying activities safely, confidently.  (approp and ongoing)Met 10/5/22  3. Pt to demonstrate ability to independently control and reduce their pain through posture positioning and mechanical movements throughout a typical day.  (approp and ongoing)Met 10/5/22  4.  Pt will demonstrate reduced pain and improved functional outcomes as reported on the FOTO by reaching a limitation score of < or = 28% or less in order to demonstrate subjective improvement in pt's condition. (approp and ongoing)Not met  5. Pt will demonstrate independence with the HEP at discharge  (approp and ongoing)  6.  Pt will understand and apply conservative way to manage her back pain, do exercise that doesn't hurt her back(patient goal)  (approp and ongoing    Plan   Continue with established Plan of Care towards established PT goals.

## 2022-11-03 ENCOUNTER — CLINICAL SUPPORT (OUTPATIENT)
Dept: REHABILITATION | Facility: HOSPITAL | Age: 47
End: 2022-11-03
Payer: COMMERCIAL

## 2022-11-03 DIAGNOSIS — G89.29 CHRONIC BILATERAL LOW BACK PAIN WITHOUT SCIATICA: Primary | ICD-10-CM

## 2022-11-03 DIAGNOSIS — M54.50 CHRONIC BILATERAL LOW BACK PAIN WITHOUT SCIATICA: Primary | ICD-10-CM

## 2022-11-03 PROCEDURE — 97110 THERAPEUTIC EXERCISES: CPT | Mod: PO,CQ

## 2022-11-03 NOTE — PROGRESS NOTES
[facility]      Name: Natali Tubbs  Clinic Number: [id number]    Therapy Diagnosis:   Encounter Diagnosis   Name Primary?    Chronic bilateral low back pain without sciatica Yes               Physician: Sharmin Aguirre PA-C    Visit Date: 11/3/2022    Physician: Sharmin Aguirre PA-C     Physician Orders: PT Eval and Treat [facility]  Medical Diagnosis from Referral: Chronic bilateral low back pain  Evaluation Date: 8/31/2022  Authorization Period Expiration: 8/23/23  Plan of Care Expiration: 12/1/22  Reassessment 10/5/22  Reassessment Due: 20th visit  Visit # / Visits authorized: 14/ 20     Time In: 7:18AM  Time Out: 8:08 AM  Total Billable Time:  45 minutes  Insurance:Fee for service Insurance Patient        Precautions: Standard     Pattern of pain determined: Pattern 2  Subjective    Natali reports her pain is less this morning, even after watching grandchildren yesterday.  Patient reports tolerating previous visit was well with general soreness.  Patient reports their pain to be  3 /10 on a 0-10 scale with 0 being no pain and 10 being the worst pain imaginable.  Pain Location:  bilateral back, post thighs      Occupation: nurse practioner  Leisure: exercise, ride bike, read      Pts goals: conservative way to manage her back pain, do exercise that doesn't hurt her back  Objective     Baseline IM Testing Results:     Baseline Isometric Testing on Med X equipment: Testing administered by PT  Date of testing:                 10/5/22               8/31/22  ROM 0-51 deg  0-45 deg   Max Peak Torque 280  131   Min Peak Torque 55  42   Flex/Ext Ratio 5:1 3:1   % below normative data  42   % above normative data 30          Outcomes:  Initial visit: 33%  Visit 5:33%  Visit 10: 33%    Treatment    Pt was instructed in and performed the following:     Natali received therapeutic exercises to develop/improved posture, cardiovascular endurance, muscular endurance,  lumbar/cervical ROM, strength and muscular endurance for 60 minutes including the following exercises:     Single KTC 5x 5 sec ea  Bilateral KTC 5 sec x5  Posterior pelvic tilt x20  Seated lumbar flexion 5x 5 sec  Lumbar stabilization supine march in place x20   Lumbar stabilization leg extension 2/10 ea  [facility] 11/3/2022   Visit Number 14   VAS Pain Rating 3   Time -   Flexion in Lying 5   Flexion in Sitting 5   Lumbar Extension Seat Pad -   Femur Restraint -   Top Dead Center -   Counterweight -   Lumbar Flexion -   Lumbar Extension -   Lumbar Peak Torque -   Min Torque -   Test Percent Below Normative Data -   Test Percent Gain in Strength from Initial  -   Lumbar Weight 44   Repetitions 15   Rating of Perceived Exertion 4   Ice - Z Lie (in min.) 10                   Peripheral muscle strengthening which included 1 set of 15-20 repetitions at a slow, controlled 10-13 second per rep pace focused on strengthening supporting musculature for improved body mechanics and functional mobility.  Pt and therapist focused on proper form during treatment to ensure optimal strengthening of each targeted muscle group.  Machines were utilized including torso rotation, leg extension, leg curl, chest press, upright row. Tricep extension, bicep curl, leg press, and hip abduction added visit 3    Natali received the following manual therapy techniques: n/a were applied to the: n/a for 00 minutes.         Home Exercises Provided and Patient Education Provided   Home exercises include:  Single KTC 5x 5 sec ea  Bilateral KTC 5 sec x5  Posterior pelvic tilt x20  Seated lumbar flexion 5x 5 sec    Cardio program:9/19/22 [place] at home  Lifting education date: 10/13/22  Posture/Lumbar roll:  visit 1    Education provided:   - Educated pt on the importance of daily stretch to increase the benefit of therapy and positive results.      Written Home Exercises Provided: Patient instructed to cont prior HEP.  Exercises were  reviewed and Justin was able to demonstrate them prior to the end of the session.  Justin demonstrated good  understanding of the education provided.     See EMR under Patient Instructions for exercises provided prior visit.          Assessment     Pt making progress with strength as indicated by increased resistance throughout core and extremities without pain and with appropriate perceived exertion. Stretching decreasing pain at home.   Reassessment 10/5/22:  Patient has attended 10 visits at [facility] which included MD evaluation, PT evaluation with isometric testing, and physical therapy treatment including HEP instruction, education, aerobic work, dynamic strengthening on med ex equipment for the spine, and whole body strengthening on med ex equipment with increasing weight loads.  Patient  is demonstrating increased ability to reduce symptoms, improved posture, improved   ROM, and improved   strength as follows:  Improvement in strength, ROM, pain and function.  -Improved posture,   focusing on abdominal bracing   -Improved lumbar ROM,  initially on med ex test 0-45 degrees and   currently 0-51 degrees  -Improved strength at each test point on lumbar med ex IM test with   101% average improvement noted with Reduced pain  Noted by patient      Patient is making good progress towards established goals.  Pt will continue to benefit from skilled outpatient physical therapy to address the deficits stated in the impairment chart, provide pt/family education and to maximize pt's level of independence in the home and community environment.     Anticipated Barriers for therapy: none  Pt's spiritual, cultural and educational needs considered and pt agreeable to plan of care and goals as stated below:       GOALS: Pt is in agreement with the following goals.     Short term goals:  6 weeks or 10 visits   1.  Pt will demonstrate increased lumbar ROM by at least 3 degrees from the initial ROM value with  improvements noted in functional ROM and ability to perform ADLs.  (approp and ongoing)Met 10/5/22  2.  Pt will demonstrate increased MedX average isometric strength value  by 20% from initial test resulting in improved ability to perform bending, lifting, and carrying activities safely, confidently.  (approp and ongoing)Met 10/5/22  3.  Patient report a reduction in worst pain score by 1-2 points for improved tolerance for standing, prolonged sitting and exercise.  (approp and ongoing)Progressing  4.  Pt able to perform HEP correctly with minimal cueing or supervision from therapist to encourage independent management of symptoms. (approp and ongoing)Met 10/5/22        Long term goals: 10 weeks or 20 visits   1. Pt will demonstrate increased lumbar ROM by at least 6 degrees from initial ROM value, resulting in improved ability to perform functional fwd bending while standing and sitting. (approp and ongoing)Met 10/5/22  2. Pt will demonstrate increased MedX average isometric strength value  by 30% from initial test resulting in improved ability to perform bending, lifting, and carrying activities safely, confidently.  (approp and ongoing)Met 10/5/22  3. Pt to demonstrate ability to independently control and reduce their pain through posture positioning and mechanical movements throughout a typical day.  (approp and ongoing)Met 10/5/22  4.  Pt will demonstrate reduced pain and improved functional outcomes as reported on the FOTO by reaching a limitation score of < or = 28% or less in order to demonstrate subjective improvement in pt's condition. (approp and ongoing)Not met  5. Pt will demonstrate independence with the HEP at discharge  (approp and ongoing)  6.  Pt will understand and apply conservative way to manage her back pain, do exercise that doesn't hurt her back(patient goal)  (approp and ongoing    Plan   Continue with established Plan of Care towards established PT goals.

## 2022-11-21 ENCOUNTER — TELEPHONE (OUTPATIENT)
Dept: REHABILITATION | Facility: HOSPITAL | Age: 47
End: 2022-11-21
Payer: COMMERCIAL

## 2022-11-21 NOTE — TELEPHONE ENCOUNTER
Spoke with pt. She has had to cancel last few appts as she has been caring for grandchildren. Encouraged her to keep up with her exercises. She will call when she is able to return.

## 2023-03-14 PROBLEM — M54.50 CHRONIC BILATERAL LOW BACK PAIN WITHOUT SCIATICA: Status: RESOLVED | Noted: 2022-09-01 | Resolved: 2022-11-03

## 2023-03-14 PROBLEM — G89.29 CHRONIC BILATERAL LOW BACK PAIN WITHOUT SCIATICA: Status: RESOLVED | Noted: 2022-09-01 | Resolved: 2022-11-03

## 2023-03-14 PROBLEM — R29.898 DECREASED STRENGTH OF TRUNK AND BACK: Status: RESOLVED | Noted: 2022-09-01 | Resolved: 2022-11-03

## 2023-08-01 ENCOUNTER — OFFICE VISIT (OUTPATIENT)
Dept: PAIN MEDICINE | Facility: CLINIC | Age: 48
End: 2023-08-01
Payer: COMMERCIAL

## 2023-08-01 VITALS
HEIGHT: 64 IN | BODY MASS INDEX: 40.46 KG/M2 | DIASTOLIC BLOOD PRESSURE: 97 MMHG | SYSTOLIC BLOOD PRESSURE: 135 MMHG | HEART RATE: 91 BPM | WEIGHT: 237 LBS

## 2023-08-01 DIAGNOSIS — M54.16 LUMBAR RADICULOPATHY: Primary | ICD-10-CM

## 2023-08-01 PROCEDURE — 4010F PR ACE/ARB THEARPY RXD/TAKEN: ICD-10-PCS | Mod: CPTII,S$GLB,, | Performed by: PHYSICIAN ASSISTANT

## 2023-08-01 PROCEDURE — 99213 OFFICE O/P EST LOW 20 MIN: CPT | Mod: S$GLB,,, | Performed by: PHYSICIAN ASSISTANT

## 2023-08-01 PROCEDURE — 1159F PR MEDICATION LIST DOCUMENTED IN MEDICAL RECORD: ICD-10-PCS | Mod: CPTII,S$GLB,, | Performed by: PHYSICIAN ASSISTANT

## 2023-08-01 PROCEDURE — 1160F PR REVIEW ALL MEDS BY PRESCRIBER/CLIN PHARMACIST DOCUMENTED: ICD-10-PCS | Mod: CPTII,S$GLB,, | Performed by: PHYSICIAN ASSISTANT

## 2023-08-01 PROCEDURE — 3075F PR MOST RECENT SYSTOLIC BLOOD PRESS GE 130-139MM HG: ICD-10-PCS | Mod: CPTII,S$GLB,, | Performed by: PHYSICIAN ASSISTANT

## 2023-08-01 PROCEDURE — 3075F SYST BP GE 130 - 139MM HG: CPT | Mod: CPTII,S$GLB,, | Performed by: PHYSICIAN ASSISTANT

## 2023-08-01 PROCEDURE — 99999 PR PBB SHADOW E&M-EST. PATIENT-LVL IV: ICD-10-PCS | Mod: PBBFAC,,, | Performed by: PHYSICIAN ASSISTANT

## 2023-08-01 PROCEDURE — 4010F ACE/ARB THERAPY RXD/TAKEN: CPT | Mod: CPTII,S$GLB,, | Performed by: PHYSICIAN ASSISTANT

## 2023-08-01 PROCEDURE — 99999 PR PBB SHADOW E&M-EST. PATIENT-LVL IV: CPT | Mod: PBBFAC,,, | Performed by: PHYSICIAN ASSISTANT

## 2023-08-01 PROCEDURE — 3008F BODY MASS INDEX DOCD: CPT | Mod: CPTII,S$GLB,, | Performed by: PHYSICIAN ASSISTANT

## 2023-08-01 PROCEDURE — 99213 PR OFFICE/OUTPT VISIT, EST, LEVL III, 20-29 MIN: ICD-10-PCS | Mod: S$GLB,,, | Performed by: PHYSICIAN ASSISTANT

## 2023-08-01 PROCEDURE — 1159F MED LIST DOCD IN RCRD: CPT | Mod: CPTII,S$GLB,, | Performed by: PHYSICIAN ASSISTANT

## 2023-08-01 PROCEDURE — 3080F DIAST BP >= 90 MM HG: CPT | Mod: CPTII,S$GLB,, | Performed by: PHYSICIAN ASSISTANT

## 2023-08-01 PROCEDURE — 3080F PR MOST RECENT DIASTOLIC BLOOD PRESSURE >= 90 MM HG: ICD-10-PCS | Mod: CPTII,S$GLB,, | Performed by: PHYSICIAN ASSISTANT

## 2023-08-01 PROCEDURE — 1160F RVW MEDS BY RX/DR IN RCRD: CPT | Mod: CPTII,S$GLB,, | Performed by: PHYSICIAN ASSISTANT

## 2023-08-01 PROCEDURE — 3008F PR BODY MASS INDEX (BMI) DOCUMENTED: ICD-10-PCS | Mod: CPTII,S$GLB,, | Performed by: PHYSICIAN ASSISTANT

## 2023-08-01 NOTE — PROGRESS NOTES
[facility] New Follow Up        PCP:   Luc Novoa MD    CC:   Chief Complaint   Patient presents with    Low-back Pain     Pain radiates down the left leg.          HPI:   Natali Tubbs is a 47 y.o. year old female patient who presnts for back pain.  She was last seen with me in Auguast 2022 for chronic lower back pain greater than radicular leg pain.  She underwent healthy back PT.  Pain significantly improved and she was doing well.  She has been helping care for her mom.  A few days ago she helped turn her mom and she felt a pop in the left hip region.  She has pain in the left lower back, hip and left anteiror/ lateral thigh.  She was seen in the ER at [facility] to severity of pain 7/27/23.  Pain increases with certain movements and does improve with sitting.  She has taken motrin, medrol taper, robaxin, flexeril, I'm toradol. Has tried heat, ice, tens unit massage.  Pain persists.     Denies bowel/ bladder incontinence.    Past and current medications:  Antineuropathics:  NSAIDs:  motrin, (tried IM toradol)  Antidepressants: wellbutrin  Muscle relaxers:  flexeril, robaxin  Opioids:  Antiplatelets/Anticoagulants:  Others:  medrol taper    Physical Therapy/ Chiropractic care:  Healthy [facility] PT 8/31/22 to 11/3/22    Pain Intervention History:  none    Past Spine Surgical History:  none        History:    Current Outpatient Medications:     buPROPion (WELLBUTRIN XL) 300 MG 24 hr tablet, Take 300 mg by mouth., Disp: , Rfl:     methocarbamoL (ROBAXIN) 500 MG Tab, Take 500 mg by mouth as needed., Disp: , Rfl:     norethindrone (MICRONOR) 0.35 mg tablet, Take 1 tablet by mouth once daily., Disp: , Rfl:     NURTEC 75 mg odt, Take by mouth as needed., Disp: , Rfl:     pantoprazole (PROTONIX) 40 MG tablet, Take 40 mg by mouth., Disp: , Rfl:     valACYclovir (VALTREX) 500 MG tablet, Take 500 mg by mouth as needed., Disp: , Rfl:     cetirizine (ZYRTEC) 10 MG tablet, Take 10 mg by mouth as  "needed., Disp: , Rfl:     History reviewed. No pertinent past medical history.    History reviewed. No pertinent surgical history.    History reviewed. No pertinent family history.    Social History     Socioeconomic History    Marital status:    Tobacco Use    Smoking status: Never    Smokeless tobacco: Never       Review of patient's allergies indicates:   Allergen Reactions    Pcn [penicillins] Other (See Comments)     Unknown, when young       Labs:  No results found for: "LABA1C", "HGBA1C"    No results found for: "WBC", "HGB", "HCT", "MCV", "PLT"        Review of Systems:  Low back pain.  Left thigh pain..  Balance of review of systems is negative.    Physical Exam:  Vitals:    08/01/23 1456   BP: (!) 135/97   Pulse: 91   Weight: 107.5 kg (236 lb 15.9 oz)   Height: 5' 4" (1.626 m)   PainSc:   6   PainLoc: Hip     Body mass index is 40.68 kg/m².    Gen: NAD  Psych: mood appropriate for given condition  HEENT: eyes anicteric   CV: RRR, 2+ radial pulse  HEENT: anicteric   Respiratory: non-labored, no signs of respiratory distress  Abd: non-distended  Skin: warm, dry and intact.  Gait: Able to heel walk, toe walk. No antalgic gait.     Coordination:   Tandem walking coordination: normal    Cervical spine: ROM is full in flexion, extension and lateral rotation without increased pain.  Spurling's maneuver causes no neck pain to either side.  Myofascial exam: No Tenderness to palpation across cervical paraspinous region bilaterally.    Lumbar spine:  Lumbar spine: ROM is full with flexion extension and oblique extension with no increased pain.    Samson's test causes no increased pain on either side.    Supine straight leg raise is negative bilaterally.    Internal and external rotation of the hip causes no increased pain on either side.  Myofascial exam: No tenderness to palpation across lumbar paraspinous muscles. No tenderness to palpation over the bilateral greater trochanters and bilateral SI " joint    Sensory:  Intact and symmetrical to light touch in C4-T1 dermatomes bilaterally. Intact and symmetrical to light touch in L1-S1 dermatomes bilaterally.    Motor:    Right Left   C4 Shoulder Abduction  5  5   C5 Elbow Flexion    5  5   C6 Wrist Extension  5  5   C7 Elbow Extension   5  5   C8/T1 Hand Intrinsics   5  5        Right Left   L2/3 Iliacus Hip flexion  5  5   L3/4 Qudratus Femoris Knee Extension  5  5   L4/5 Tib Anterior Ankle Dorsiflexion   5  5   L5/S1 Extensor Hallicus Longus Great toe extension  5  5   S1/S2 Gastroc/Soleus Plantar Flexion  5  5      Right Left   Triceps DTR 2+ 2+   Biceps DTR 2+ 2+   Brachioradialis DTR 2+ 2+   Patellar DTR 2+ 2+   Achilles DTR 2+ 2+   Jones Absent  Absent   Clonus Absent Absent   Babinski Absent Absent       Imaging:  No spine imaging.       Assessment:   Natali Tubbs is a 47 y.o. year old female patient who presents for left lower back pain and left L3, L4 radiculoapthy.  No neurological deficits.      Problem List Items Addressed This Visit    None  Visit Diagnoses       Lumbar radiculopathy    -  Primary    Relevant Orders    Ambulatory referral/consult to Physical/Occupational Therapy    MRI Lumbar Spine Without Contrast            Plan:   - recommend PT  - will obatin MRI considering degree of pain and no improvement with medications tried thus far.  - encouraged movement and stretching at home.    Follow Up: RTC after imaging      : Reviewed and consistent with medication use as prescribed.    Thank you for referring this interesting patient, and I look forward to continuing to collaborate in her care.        Sharmin Aguirre PA-C  [facility]

## 2023-09-21 ENCOUNTER — OFFICE VISIT (OUTPATIENT)
Dept: PAIN MEDICINE | Facility: CLINIC | Age: 48
End: 2023-09-21
Payer: COMMERCIAL

## 2023-09-21 VITALS
WEIGHT: 237 LBS | DIASTOLIC BLOOD PRESSURE: 78 MMHG | SYSTOLIC BLOOD PRESSURE: 111 MMHG | HEIGHT: 64 IN | BODY MASS INDEX: 40.46 KG/M2 | HEART RATE: 88 BPM

## 2023-09-21 DIAGNOSIS — G89.29 CHRONIC BILATERAL LOW BACK PAIN, UNSPECIFIED WHETHER SCIATICA PRESENT: ICD-10-CM

## 2023-09-21 DIAGNOSIS — M51.26 HERNIATED LUMBAR INTERVERTEBRAL DISC: Primary | ICD-10-CM

## 2023-09-21 DIAGNOSIS — M54.50 CHRONIC BILATERAL LOW BACK PAIN, UNSPECIFIED WHETHER SCIATICA PRESENT: ICD-10-CM

## 2023-09-21 PROCEDURE — 99213 PR OFFICE/OUTPT VISIT, EST, LEVL III, 20-29 MIN: ICD-10-PCS | Mod: S$GLB,,, | Performed by: PHYSICIAN ASSISTANT

## 2023-09-21 PROCEDURE — 3074F SYST BP LT 130 MM HG: CPT | Mod: CPTII,S$GLB,, | Performed by: PHYSICIAN ASSISTANT

## 2023-09-21 PROCEDURE — 3008F PR BODY MASS INDEX (BMI) DOCUMENTED: ICD-10-PCS | Mod: CPTII,S$GLB,, | Performed by: PHYSICIAN ASSISTANT

## 2023-09-21 PROCEDURE — 3008F BODY MASS INDEX DOCD: CPT | Mod: CPTII,S$GLB,, | Performed by: PHYSICIAN ASSISTANT

## 2023-09-21 PROCEDURE — 3074F PR MOST RECENT SYSTOLIC BLOOD PRESSURE < 130 MM HG: ICD-10-PCS | Mod: CPTII,S$GLB,, | Performed by: PHYSICIAN ASSISTANT

## 2023-09-21 PROCEDURE — 1160F PR REVIEW ALL MEDS BY PRESCRIBER/CLIN PHARMACIST DOCUMENTED: ICD-10-PCS | Mod: CPTII,S$GLB,, | Performed by: PHYSICIAN ASSISTANT

## 2023-09-21 PROCEDURE — 4010F PR ACE/ARB THEARPY RXD/TAKEN: ICD-10-PCS | Mod: CPTII,S$GLB,, | Performed by: PHYSICIAN ASSISTANT

## 2023-09-21 PROCEDURE — 99213 OFFICE O/P EST LOW 20 MIN: CPT | Mod: S$GLB,,, | Performed by: PHYSICIAN ASSISTANT

## 2023-09-21 PROCEDURE — 1160F RVW MEDS BY RX/DR IN RCRD: CPT | Mod: CPTII,S$GLB,, | Performed by: PHYSICIAN ASSISTANT

## 2023-09-21 PROCEDURE — 4010F ACE/ARB THERAPY RXD/TAKEN: CPT | Mod: CPTII,S$GLB,, | Performed by: PHYSICIAN ASSISTANT

## 2023-09-21 PROCEDURE — 3078F DIAST BP <80 MM HG: CPT | Mod: CPTII,S$GLB,, | Performed by: PHYSICIAN ASSISTANT

## 2023-09-21 PROCEDURE — 1159F PR MEDICATION LIST DOCUMENTED IN MEDICAL RECORD: ICD-10-PCS | Mod: CPTII,S$GLB,, | Performed by: PHYSICIAN ASSISTANT

## 2023-09-21 PROCEDURE — 99999 PR PBB SHADOW E&M-EST. PATIENT-LVL III: CPT | Mod: PBBFAC,,, | Performed by: PHYSICIAN ASSISTANT

## 2023-09-21 PROCEDURE — 99999 PR PBB SHADOW E&M-EST. PATIENT-LVL III: ICD-10-PCS | Mod: PBBFAC,,, | Performed by: PHYSICIAN ASSISTANT

## 2023-09-21 PROCEDURE — 1159F MED LIST DOCD IN RCRD: CPT | Mod: CPTII,S$GLB,, | Performed by: PHYSICIAN ASSISTANT

## 2023-09-21 PROCEDURE — 3078F PR MOST RECENT DIASTOLIC BLOOD PRESSURE < 80 MM HG: ICD-10-PCS | Mod: CPTII,S$GLB,, | Performed by: PHYSICIAN ASSISTANT

## 2023-09-21 RX ORDER — NIFEDIPINE 30 MG/1
30 TABLET, EXTENDED RELEASE ORAL
COMMUNITY
Start: 2023-09-15

## 2023-09-21 NOTE — PROGRESS NOTES
[facility] Follow Up        PCP:   Luc Novoa MD    CC:   Chief Complaint   Patient presents with    Follow-up     MRI results for lbp.          HPI:      returns for follow up of lower back and left thigh pain.  Since last visit, she started walking more and moving around.  Pain has improved.  Left thigh pain resolved.  She has started to feel some lower back pain again, but it is relieved with stretching forward.  She is pleased with progress.  She did not start PT due to pain improveing on its own and due to caring for her mother who recently passed away.    INitial HPI:  Natali Tubbs is a 47 y.o. year old female patient who presnts for back pain.  She was last seen with me in Auguast 2022 for chronic lower back pain greater than radicular leg pain.  She underwent healthy back PT.  Pain significantly improved and she was doing well.  She has been helping care for her mom.  A few days ago she helped turn her mom and she felt a pop in the left hip region.  She has pain in the left lower back, hip and left anteiror/ lateral thigh.  She was seen in the ER at [facility] to severity of pain 7/27/23.  Pain increases with certain movements and does improve with sitting.  She has taken motrin, medrol taper, robaxin, flexeril, I'm toradol. Has tried heat, ice, tens unit massage.  Pain persists.     Denies bowel/ bladder incontinence.    Past and current medications:  Antineuropathics:  NSAIDs:  motrin, (tried IM toradol)  Antidepressants: wellbutrin  Muscle relaxers:  flexeril, robaxin  Opioids:  Antiplatelets/Anticoagulants:  Others:  medrol taper    Physical Therapy/ Chiropractic care:  [facility]y [facility] PT 8/31/22 to 11/3/22    Pain Intervention History:  none    Past Spine Surgical History:  none        History:    Current Outpatient Medications:     buPROPion (WELLBUTRIN XL) 300 MG 24 hr tablet, Take 300 mg by mouth., Disp: , Rfl:     methocarbamoL (ROBAXIN) 500 MG Tab, Take 500 mg  "by mouth as needed., Disp: , Rfl:     NIFEdipine (PROCARDIA-XL) 30 MG (OSM) 24 hr tablet, Take 30 mg by mouth., Disp: , Rfl:     norethindrone (MICRONOR) 0.35 mg tablet, Take 1 tablet by mouth once daily., Disp: , Rfl:     NURTEC 75 mg odt, Take by mouth as needed., Disp: , Rfl:     pantoprazole (PROTONIX) 40 MG tablet, Take 40 mg by mouth., Disp: , Rfl:     valACYclovir (VALTREX) 500 MG tablet, Take 500 mg by mouth as needed., Disp: , Rfl:     cetirizine (ZYRTEC) 10 MG tablet, Take 10 mg by mouth as needed., Disp: , Rfl:     No past medical history on file.    No past surgical history on file.    No family history on file.    Social History     Socioeconomic History    Marital status:    Tobacco Use    Smoking status: Never    Smokeless tobacco: Never       Review of patient's allergies indicates:   Allergen Reactions    Lisinopril Anaphylaxis    Pcn [penicillins] Other (See Comments)     Unknown, when young       Labs:  No results found for: "LABA1C", "HGBA1C"    No results found for: "WBC", "HGB", "HCT", "MCV", "PLT"        Review of Systems:  Low back pain.  Left thigh pain..  Balance of review of systems is negative.    Physical Exam:  Vitals:    09/21/23 0846   BP: 111/78   Pulse: 88   Weight: 107.5 kg (236 lb 15.9 oz)   Height: 5' 4" (1.626 m)   PainSc:   2   PainLoc: Back     Body mass index is 40.68 kg/m².    Gen: NAD  Psych: mood appropriate for given condition  HEENT: eyes anicteric   CV: RRR, 2+ radial pulse  HEENT: anicteric   Respiratory: non-labored, no signs of respiratory distress  Abd: non-distended  Skin: warm, dry and intact.  Gait: Able to heel walk, toe walk. No antalgic gait.     Coordination:   Tandem walking coordination: normal    Cervical spine: ROM is full in flexion, extension and lateral rotation without increased pain.  Spurling's maneuver causes no neck pain to either side.  Myofascial exam: No Tenderness to palpation across cervical paraspinous region bilaterally.    Lumbar " spine:  Lumbar spine: ROM is full with flexion extension and oblique extension with no increased pain.    Samson's test causes no increased pain on either side.    Supine straight leg raise is negative bilaterally.    Internal and external rotation of the hip causes no increased pain on either side.  Myofascial exam: No tenderness to palpation across lumbar paraspinous muscles. No tenderness to palpation over the bilateral greater trochanters and bilateral SI joint    Sensory:  Intact and symmetrical to light touch in C4-T1 dermatomes bilaterally. Intact and symmetrical to light touch in L1-S1 dermatomes bilaterally.    Motor:    Right Left   C4 Shoulder Abduction  5  5   C5 Elbow Flexion    5  5   C6 Wrist Extension  5  5   C7 Elbow Extension   5  5   C8/T1 Hand Intrinsics   5  5        Right Left   L2/3 Iliacus Hip flexion  5  5   L3/4 Qudratus Femoris Knee Extension  5  5   L4/5 Tib Anterior Ankle Dorsiflexion   5  5   L5/S1 Extensor Hallicus Longus Great toe extension  5  5   S1/S2 Gastroc/Soleus Plantar Flexion  5  5      Right Left   Triceps DTR 2+ 2+   Biceps DTR 2+ 2+   Brachioradialis DTR 2+ 2+   Patellar DTR 2+ 2+   Achilles DTR 2+ 2+   Jones Absent  Absent   Clonus Absent Absent   Babinski Absent Absent       Imaging:    MRI lumbar spine report (she did not bring the images to review) from [place] 9-11-23 states:    L1-2:  There is no disc bulge or focal protrusion.  There is no central spinal stenosis or foraminal narrowing.   L2-3:  There is no disc bulge or focal protrusion.  There is no central spinal stenosis or foraminal narrowing.   L3-4:  Left eccentric disc bulging and moderate posterior facet arthropathy. Mild left lateral recess stenosis and moderate left neural foraminal stenosis. No right neural foraminal stenosis.   L4-5:  Mild central disc bulging and posterior facet arthropathy.  There is no central spinal stenosis or foraminal narrowing.   L5-S1:  Minimal central disc bulging.   There is no central spinal stenosis or foraminal narrowing.       Assessment:   Natali Tubbs is a 47 y.o. year old female patient who presents for left lower back pain and left L3, L4 radiculopathy due to L3/4 left disk hernia described on imaging report.  Radiculopathy significantly improved.  She is pelase with progress.    Problem List Items Addressed This Visit    None  Visit Diagnoses       Herniated lumbar intervertebral disc    -  Primary    Chronic bilateral low back pain, unspecified whether sciatica present                  Plan:   - recommend PT if pain increases.  - home exercise for now  - she will bring the imaging study by the office to review.   - encouraged movement and stretching at home.    Follow Up: RTC as needed    : Reviewed and consistent with medication use as prescribed.        Sharmin Aguirre PA-C  [facility]

## 2024-09-18 ENCOUNTER — OFFICE VISIT (OUTPATIENT)
Dept: DERMATOLOGY | Facility: CLINIC | Age: 49
End: 2024-09-18
Payer: COMMERCIAL

## 2024-09-18 DIAGNOSIS — L81.9 DYSPIGMENTATION: Primary | ICD-10-CM

## 2024-09-18 DIAGNOSIS — L81.8 IDIOPATHIC GUTTATE HYPOMELANOSIS: ICD-10-CM

## 2024-09-18 PROCEDURE — 1160F RVW MEDS BY RX/DR IN RCRD: CPT | Mod: CPTII,95,, | Performed by: STUDENT IN AN ORGANIZED HEALTH CARE EDUCATION/TRAINING PROGRAM

## 2024-09-18 PROCEDURE — 1159F MED LIST DOCD IN RCRD: CPT | Mod: CPTII,95,, | Performed by: STUDENT IN AN ORGANIZED HEALTH CARE EDUCATION/TRAINING PROGRAM

## 2024-09-18 PROCEDURE — 99204 OFFICE O/P NEW MOD 45 MIN: CPT | Mod: 95,,, | Performed by: STUDENT IN AN ORGANIZED HEALTH CARE EDUCATION/TRAINING PROGRAM

## 2024-09-18 PROCEDURE — G2211 COMPLEX E/M VISIT ADD ON: HCPCS | Mod: 95,,, | Performed by: STUDENT IN AN ORGANIZED HEALTH CARE EDUCATION/TRAINING PROGRAM

## 2024-09-18 RX ORDER — TRETINOIN 0.25 MG/G
CREAM TOPICAL NIGHTLY
Qty: 45 G | Refills: 1 | Status: SHIPPED | OUTPATIENT
Start: 2024-09-18

## 2024-09-18 NOTE — PATIENT INSTRUCTIONS

## 2024-09-18 NOTE — PROGRESS NOTES
The patient location is: home  The chief complaint leading to consultation is: dark circles around eyes and discolored patches on the face; light spots on the legs    Visit type: audiovisual    Face to Face time with patient: 10mins  10 minutes of total time spent on the encounter, which includes face to face time and non-face to face time preparing to see the patient (eg, review of tests), Obtaining and/or reviewing separately obtained history, Documenting clinical information in the electronic or other health record, Independently interpreting results (not separately reported) and communicating results to the patient/family/caregiver, or Care coordination (not separately reported).         Each patient to whom he or she provides medical services by telemedicine is:  (1) informed of the relationship between the physician and patient and the respective role of any other health care provider with respect to management of the patient; and (2) notified that he or she may decline to receive medical services by telemedicine and may withdraw from such care at any time.    History of Present Illness: The patient presents with chief complaint of dark circles around eyes and discolored patches on the face.  Location: face  Duration: progressing for years  Signs/Symptoms: dark circles around the eyes as well as splotchy irregular discoloration on the face.   Prior treatments: none      ROS: Patient also reports having scattered lightened spots on the lower legs. Denies any symptoms with the spots, but continues to notice more and more.     PE: const/neuro - AAOx3, NAD          Skin -               A/P: 1) dyspigmentation - start tretinoin 0.025% cream nightly and daily sun protection and avoidance. Discussed other cosmetic interventions.             2) Idiopathic guttate hypomelanosis - Reassurance given to patient. No treatment is necessary.   Treatment of benign, asymptomatic lesions may be considered cosmetic.

## 2024-10-07 ENCOUNTER — OFFICE VISIT (OUTPATIENT)
Dept: PAIN MEDICINE | Facility: CLINIC | Age: 49
End: 2024-10-07
Payer: COMMERCIAL

## 2024-10-07 VITALS
HEART RATE: 77 BPM | BODY MASS INDEX: 37.63 KG/M2 | WEIGHT: 219.25 LBS | DIASTOLIC BLOOD PRESSURE: 76 MMHG | SYSTOLIC BLOOD PRESSURE: 127 MMHG

## 2024-10-07 DIAGNOSIS — M54.16 LUMBAR RADICULOPATHY: Primary | ICD-10-CM

## 2024-10-07 PROCEDURE — 99213 OFFICE O/P EST LOW 20 MIN: CPT | Mod: S$GLB,,, | Performed by: PHYSICIAN ASSISTANT

## 2024-10-07 PROCEDURE — 1159F MED LIST DOCD IN RCRD: CPT | Mod: CPTII,S$GLB,, | Performed by: PHYSICIAN ASSISTANT

## 2024-10-07 PROCEDURE — 99999 PR PBB SHADOW E&M-EST. PATIENT-LVL IV: CPT | Mod: PBBFAC,,, | Performed by: PHYSICIAN ASSISTANT

## 2024-10-07 PROCEDURE — 3074F SYST BP LT 130 MM HG: CPT | Mod: CPTII,S$GLB,, | Performed by: PHYSICIAN ASSISTANT

## 2024-10-07 PROCEDURE — 3078F DIAST BP <80 MM HG: CPT | Mod: CPTII,S$GLB,, | Performed by: PHYSICIAN ASSISTANT

## 2024-10-07 PROCEDURE — 1160F RVW MEDS BY RX/DR IN RCRD: CPT | Mod: CPTII,S$GLB,, | Performed by: PHYSICIAN ASSISTANT

## 2024-10-07 PROCEDURE — 3008F BODY MASS INDEX DOCD: CPT | Mod: CPTII,S$GLB,, | Performed by: PHYSICIAN ASSISTANT

## 2024-10-07 RX ORDER — METHYLPREDNISOLONE 4 MG/1
TABLET ORAL
Qty: 1 EACH | Refills: 0 | Status: SHIPPED | OUTPATIENT
Start: 2024-10-07 | End: 2024-10-28

## 2024-10-07 RX ORDER — CYCLOBENZAPRINE HCL 10 MG
10 TABLET ORAL NIGHTLY PRN
Qty: 30 TABLET | Refills: 0 | Status: SHIPPED | OUTPATIENT
Start: 2024-10-07

## 2024-10-07 NOTE — PROGRESS NOTES
[facility]        PCP:   Luc Novoa MD    CC:   Chief Complaint   Patient presents with    Groin Pain          HPI:     Natali Tubbs is a 48 y.o. year old female patient who presnts for back pain.  She was seen in 2023 for lower back and some left thigh pain.  She has done well overall since last visits.  She tries to exercise regualrly.  About 1 week ago 9-30-24 while doing stretches, she thinks she may have pushed it too far.  The next day she felt pain.  Pain has been in the midline and right lower lumbar region to the right buttock, hip, posterior/ lateral leg to the lower calf.  Yesterday she started to develop left lower back and buttock pain. Over the last week she has tried robaxin, voltaren, flexeril.    HPI 9-21-23:   returns for follow up of lower back and left thigh pain.  Since last visit, she started walking more and moving around.  Pain has improved.  Left thigh pain resolved.  She has started to feel some lower back pain again, but it is relieved with stretching forward.  She is pleased with progress.  She did not start PT due to pain improveing on its own and due to caring for her mother who recently passed away.    INitial HPI:   She was last seen with me in Auguast 2022 for chronic lower back pain greater than radicular leg pain.  She underwent healthy back PT.  Pain significantly improved and she was doing well.  She has been helping care for her mom.  A few days ago she helped turn her mom and she felt a pop in the left hip region.  She has pain in the left lower back, hip and left anteiror/ lateral thigh.  She was seen in the ER at [facility] to severity of pain 7/27/23.  Pain increases with certain movements and does improve with sitting.  She has taken motrin, medrol taper, robaxin, flexeril, I'm toradol. Has tried heat, ice, tens unit massage.  Pain persists.     Denies bowel/ bladder incontinence.    Past and current  medications:  Antineuropathics:  NSAIDs:  tried voltared  Antidepressants: wellbutrin  Muscle relaxers:  flexeril, robaxin  Opioids:  Antiplatelets/Anticoagulants:  Others:  medrol taper in past    Physical Therapy/ Chiropractic care:  Healthy back PT 8/31/22 to 11/3/22    Pain Intervention History:  none    Past Spine Surgical History:  none        History:    Current Outpatient Medications:     buPROPion (WELLBUTRIN XL) 300 MG 24 hr tablet, Take 300 mg by mouth., Disp: , Rfl:     NURTEC 75 mg odt, Take by mouth as needed., Disp: , Rfl:     pantoprazole (PROTONIX) 40 MG tablet, Take 40 mg by mouth., Disp: , Rfl:     tretinoin (RETIN-A) 0.025 % cream, Apply topically every evening., Disp: 45 g, Rfl: 1    valACYclovir (VALTREX) 500 MG tablet, Take 500 mg by mouth as needed., Disp: , Rfl:     cetirizine (ZYRTEC) 10 MG tablet, Take 10 mg by mouth as needed., Disp: , Rfl:     cyclobenzaprine (FLEXERIL) 10 MG tablet, Take 1 tablet (10 mg total) by mouth nightly as needed for Muscle spasms., Disp: 30 tablet, Rfl: 0    methylPREDNISolone (MEDROL, WANG,) 4 mg tablet, use as directed, Disp: 1 each, Rfl: 0    History reviewed. No pertinent past medical history.    History reviewed. No pertinent surgical history.    No family history on file.    Social History     Socioeconomic History    Marital status:    Tobacco Use    Smoking status: Never    Smokeless tobacco: Never     Social Drivers of Health     Financial Resource Strain: Unknown (6/3/2022)    Received [facility], [facility]    Overall Financial Resource Strain (CARDIA)     Difficulty of Paying Living Expenses: Patient declined   Food Insecurity: Unknown (8/8/2023)    [facility]    Hunger Vital Sign     Ran Out of Food in the Last Year: Never true   Transportation Needs: Unknown (8/8/2023)    [facility]    PRAPARE - Transportation     Lack of  "Transportation (Medical): No   Physical Activity: Unknown (6/3/2022)    [facility]    Exercise Vital Sign     Days of Exercise per Week: Patient declined     Minutes of Exercise per Session: Patient declined   Stress: Unknown (6/3/2022)    [facility]    [employer] - Occupational Stress Questionnaire     Feeling of Stress : Patient declined   Housing Stability: Unknown (8/8/2023)    [facility]    Housing Stability Vital Sign     Unstable Housing in the Last Year: No       Review of patient's allergies indicates:   Allergen Reactions    Lisinopril Anaphylaxis    Pcn [penicillins] Other (See Comments)     Unknown, when young       Labs:  Lab Results   Component Value Date    HGBA1C 5.9 (H) 10/12/2021       No results found for: "WBC", "HGB", "HCT", "MCV", "PLT"        Review of Systems:  Low back pain.  Left thigh pain..  Balance of review of systems is negative.    Physical Exam:  Vitals:    10/07/24 1140   BP: 127/76   Pulse: 77   Weight: 99.5 kg (219 lb 4 oz)   PainSc:   7   PainLoc: Back     Body mass index is 37.63 kg/m².    Gen: NAD  Psych: mood appropriate for given condition  HEENT: eyes anicteric   CV: RRR, 2+ radial pulse  HEENT: anicteric   Respiratory: non-labored, no signs of respiratory distress  Abd: non-distended  Skin: warm, dry and intact.  Gait: Able to heel walk, toe walk. No antalgic gait.     Coordination:   Tandem walking coordination: normal    Cervical spine: ROM is full in flexion, extension and lateral rotation without increased pain.  Spurling's maneuver causes no neck pain to either side.  Myofascial exam: No Tenderness to palpation across cervical paraspinous region bilaterally.    Lumbar spine:  Lumbar spine: ROM is full with flexion extension and oblique extension with no increased pain.    Samson's test causes no increased pain on either side.  "   Supine straight leg raise is negative bilaterally.    Internal and external rotation of the hip causes no increased pain on either side.  Myofascial exam: No tenderness to palpation across lumbar paraspinous muscles. No tenderness to palpation over the bilateral greater trochanters and bilateral SI joint    Sensory:  Intact and symmetrical to light touch in C4-T1 dermatomes bilaterally. Intact and symmetrical to light touch in L1-S1 dermatomes bilaterally.    Motor:    Right Left   C4 Shoulder Abduction  5  5   C5 Elbow Flexion    5  5   C6 Wrist Extension  5  5   C7 Elbow Extension   5  5   C8/T1 Hand Intrinsics   5  5        Right Left   L2/3 Iliacus Hip flexion  5  5   L3/4 Qudratus Femoris Knee Extension  5  5   L4/5 Tib Anterior Ankle Dorsiflexion   5  5   L5/S1 Extensor Hallicus Longus Great toe extension  5  5   S1/S2 Gastroc/Soleus Plantar Flexion  5  5      Right Left   Triceps DTR 2+ 2+   Biceps DTR 2+ 2+   Brachioradialis DTR 2+ 2+   Patellar DTR 2+ 2+   Achilles DTR 2+ 2+   Jones Absent  Absent   Clonus Absent Absent   Babinski Absent Absent       Imaging:    MRI lumbar spine report (she did not bring the images to review) from [place] 9-11-23 states:    L1-2:  There is no disc bulge or focal protrusion.  There is no central spinal stenosis or foraminal narrowing.   L2-3:  There is no disc bulge or focal protrusion.  There is no central spinal stenosis or foraminal narrowing.   L3-4:  Left eccentric disc bulging and moderate posterior facet arthropathy. Mild left lateral recess stenosis and moderate left neural foraminal stenosis. No right neural foraminal stenosis.   L4-5:  Mild central disc bulging and posterior facet arthropathy.  There is no central spinal stenosis or foraminal narrowing.   L5-S1:  Minimal central disc bulging.  There is no central spinal stenosis or foraminal narrowing.       Assessment:   Natali Tubbs is a 48 y.o. year old female patient who presents for lacute  onset right lower back and leg pain.  Possible right L5 radiculopathy.  No nneurological defitis.  No recent imaging.  Past MRI reoport stated a left disk hernia at L3/4, but no right sided disk hernia at that time.     Problem List Items Addressed This Visit    None  Visit Diagnoses       Lumbar radiculopathy    -  Primary    Relevant Medications    methylPREDNISolone (MEDROL, WANG,) 4 mg tablet    cyclobenzaprine (FLEXERIL) 10 MG tablet              Plan:   - will try medrol taper and refill flexeril to help with pain.    - recommend PT if pain increases.  - home exercise for now  -- encouraged movement and stretching at home.    Follow Up: RTC if no improvement.    : Reviewed and consistent with medication use as prescribed.        Sharmin Aguirre PA-C  [facility]

## 2024-10-07 NOTE — PATIENT INSTRUCTIONS
Patients Guide to Back Pain    Low back pain effects 2/3 of adults at some point in their lives.  It is one of the top 10 reasons to go to the doctor.  Back pain is scary, sudden, and painful but does usually improve.  It is usually benign and is not caused by a serious underlying disease.  95% of back pain is mechanical, meaning something makes it worse (most commonly bending and sitting).    If bending makes it worse, extension stretches can be helpful.    If standing makes it worse flexion, stretches and z-lie can be helpful.   You can find details on these stretches and more in Treat Your Own Back by Dani Baires.    Moving is the best medicine, even if it hurts. Bed rest is not recommended for back pain.  Early return to daily activities leads to less chronic disability.  Early involvement in Physical Therapy can decrease the likelihood of acute low back pain becoming chronic.1  Back pain is a complex issue to diagnose. A definite diagnosis for back pain cannot be made for nearly 85% of patients at the initial visit.   Medicine such as muscle relaxers and anti-inflammatories can be helpful. Narcotic pain medicine is not recommended for back pain.    Imaging Guidance:  Imaging such as MRIs and X-rays are not necessary in the beginning and do not influence treatment or influence the course of acute back pain. Degenerative changes such as disc bulges and arthritis are common in people as young as 18 and with no pain.  In people under 50 with no signs and symptoms of systemic disease no imaging is needed.2  Conservative care for 6 weeks is recommended for patients with or without shooting nerve pain in arms/legs prior to MRI.2  Imaging of patients with low back pain without indications of serious underlying conditions does not improve outcomes.3  RESOURCES  [facility] website:  [email or web address]  [facility]  website:  [email or web address]  Books:  Treat Your Own Back by Dani Baires  Treat Your Own Neck by Dani Baires  KEY TAKEAWAYS:  Moving is recommended. Bed rest is not recommended.  Seek Physical Therapy as early as possible.  Acute flares will improve.    1WRuss rincon et al. Nonpharmacologic options for treating acute and chronic pain. PMR journal 7 2015) [id number]  2JDarryn king and Kwadwo Hollins. Diagnostic evaluation of low back pain with emphasis on imaging. Annals of internal medicine 2002; 137:586-597  3CMeng murphy Rongwei Fu, Kwadwo Hayes. Imaging studies for low back pain: systemic review and meta-analysis. Lancet 2009;373 463-43

## 2024-10-11 ENCOUNTER — PATIENT MESSAGE (OUTPATIENT)
Dept: PAIN MEDICINE | Facility: CLINIC | Age: 49
End: 2024-10-11
Payer: COMMERCIAL

## 2024-10-18 ENCOUNTER — OFFICE VISIT (OUTPATIENT)
Dept: PAIN MEDICINE | Facility: CLINIC | Age: 49
End: 2024-10-18
Payer: COMMERCIAL

## 2024-10-18 VITALS
BODY MASS INDEX: 38.15 KG/M2 | DIASTOLIC BLOOD PRESSURE: 89 MMHG | SYSTOLIC BLOOD PRESSURE: 133 MMHG | WEIGHT: 223.44 LBS | HEART RATE: 80 BPM | HEIGHT: 64 IN

## 2024-10-18 DIAGNOSIS — M54.16 LUMBAR RADICULOPATHY, CHRONIC: Primary | ICD-10-CM

## 2024-10-18 PROCEDURE — 3079F DIAST BP 80-89 MM HG: CPT | Mod: CPTII,S$GLB,, | Performed by: PHYSICIAN ASSISTANT

## 2024-10-18 PROCEDURE — 3075F SYST BP GE 130 - 139MM HG: CPT | Mod: CPTII,S$GLB,, | Performed by: PHYSICIAN ASSISTANT

## 2024-10-18 PROCEDURE — 99213 OFFICE O/P EST LOW 20 MIN: CPT | Mod: S$GLB,,, | Performed by: PHYSICIAN ASSISTANT

## 2024-10-18 PROCEDURE — 1159F MED LIST DOCD IN RCRD: CPT | Mod: CPTII,S$GLB,, | Performed by: PHYSICIAN ASSISTANT

## 2024-10-18 PROCEDURE — 99999 PR PBB SHADOW E&M-EST. PATIENT-LVL IV: CPT | Mod: PBBFAC,,, | Performed by: PHYSICIAN ASSISTANT

## 2024-10-18 PROCEDURE — 1160F RVW MEDS BY RX/DR IN RCRD: CPT | Mod: CPTII,S$GLB,, | Performed by: PHYSICIAN ASSISTANT

## 2024-10-18 PROCEDURE — 3008F BODY MASS INDEX DOCD: CPT | Mod: CPTII,S$GLB,, | Performed by: PHYSICIAN ASSISTANT

## 2024-10-18 RX ORDER — LISDEXAMFETAMINE DIMESYLATE 60 MG/1
60 CAPSULE ORAL EVERY MORNING
COMMUNITY

## 2024-10-18 RX ORDER — NEBIVOLOL 20 MG/1
20 TABLET ORAL DAILY
COMMUNITY
Start: 2024-01-19 | End: 2025-01-19

## 2024-10-18 RX ORDER — TRIAMTERENE AND HYDROCHLOROTHIAZIDE 37.5; 25 MG/1; MG/1
1 CAPSULE ORAL EVERY MORNING
COMMUNITY

## 2024-10-21 NOTE — PROGRESS NOTES
[facility]        PCP:   Luc Novoa MD    CC:   Chief Complaint   Patient presents with    Hip Pain     Right hip    Leg Pain     Radiating down her leg          HPI:     Natali Tubbs is a 48 y.o. year old female patient who presnts for back pain.  She has been followed for pain in the midline and right lower lumbar region to the right buttock, hip, posterior/ lateral leg to the lower calf.  She has had some left buttock pain as well.  Medrol taper helped some initially, but pain recurred and feels worse than at the time of her last visit.  She feels weak in the legs with going up stairs.      HPI 10-7-24:  Natali Tubbs is a 48 y.o. year old female patient who presnts for back pain.  She was seen in 2023 for lower back and some left thigh pain.  She has done well overall since last visits.  She tries to exercise regualrly.  About 1 week ago 9-30-24 while doing stretches, she thinks she may have pushed it too far.  The next day she felt pain.  Pain has been in the midline and right lower lumbar region to the right buttock, hip, posterior/ lateral leg to the lower calf.  Yesterday she started to develop left lower back and buttock pain. Over the last week she has tried robaxin, voltaren, flexeril.    HPI 9-21-23:   returns for follow up of lower back and left thigh pain.  Since last visit, she started walking more and moving around.  Pain has improved.  Left thigh pain resolved.  She has started to feel some lower back pain again, but it is relieved with stretching forward.  She is pleased with progress.  She did not start PT due to pain improveing on its own and due to caring for her mother who recently passed away.    INitial HPI:   She was last seen with me in Auguast 2022 for chronic lower back pain greater than radicular leg pain.  She underwent healthy back PT.  Pain significantly improved and she was doing well.  She has been helping care for her mom.  A  few days ago she helped turn her mom and she felt a pop in the left hip region.  She has pain in the left lower back, hip and left anteiror/ lateral thigh.  She was seen in the ER at [facility] to severity of pain 7/27/23.  Pain increases with certain movements and does improve with sitting.  She has taken motrin, medrol taper, robaxin, flexeril, I'm toradol. Has tried heat, ice, tens unit massage.  Pain persists.     Denies bowel/ bladder incontinence.    Past and current medications:  Antineuropathics:  NSAIDs:  tried voltared  Antidepressants: wellbutrin  Muscle relaxers:  flexeril, robaxin  Opioids:  Antiplatelets/Anticoagulants:  Others:  medrol taper in past and recently completed.     Physical Therapy/ Chiropractic care:  Healthy back PT 8/31/22 to 11/3/22    Pain Intervention History:  none    Past Spine Surgical History:  none        History:    Current Outpatient Medications:     buPROPion (WELLBUTRIN XL) 300 MG 24 hr tablet, Take 300 mg by mouth., Disp: , Rfl:     cyclobenzaprine (FLEXERIL) 10 MG tablet, Take 1 tablet (10 mg total) by mouth nightly as needed for Muscle spasms., Disp: 30 tablet, Rfl: 0    nebivoloL (BYSTOLIC) 20 mg Tab, Take 20 mg by mouth once daily., Disp: , Rfl:     NURTEC 75 mg odt, Take by mouth as needed., Disp: , Rfl:     pantoprazole (PROTONIX) 40 MG tablet, Take 40 mg by mouth., Disp: , Rfl:     triamterene-hydrochlorothiazide 37.5-25 mg (DYAZIDE) 37.5-25 mg per capsule, Take 1 capsule by mouth every morning., Disp: , Rfl:     valACYclovir (VALTREX) 500 MG tablet, Take 500 mg by mouth as needed., Disp: , Rfl:     VYVANSE 60 mg capsule, Take 60 mg by mouth every morning., Disp: , Rfl:     cetirizine (ZYRTEC) 10 MG tablet, Take 10 mg by mouth as needed., Disp: , Rfl:     methylPREDNISolone (MEDROL, WANG,) 4 mg tablet, use as directed, Disp: 1 each, Rfl: 0    tretinoin (RETIN-A) 0.025 % cream, Apply topically every evening., Disp: 45 g, Rfl: 1    History reviewed. No pertinent past  "medical history.    History reviewed. No pertinent surgical history.    No family history on file.    Social History     Socioeconomic History    Marital status:    Tobacco Use    Smoking status: Never    Smokeless tobacco: Never     Social Drivers of Health     Financial Resource Strain: Unknown (6/3/2022)    Received from [facility], [facility]    Overall Financial Resource Strain (CARDIA)     Difficulty of Paying Living Expenses: Patient declined   Food Insecurity: Unknown (8/8/2023)    [facility]    Hunger Vital Sign     Ran Out of Food in the Last Year: Never true   Transportation Needs: Unknown (8/8/2023)    [facility]    PRAPARE - Transportation     Lack of Transportation (Medical): No   Physical Activity: Unknown (6/3/2022)    Received from [facility], [facility]    Exercise Vital Sign     Days of Exercise per Week: Patient declined     Minutes of Exercise per Session: Patient declined   Stress: Unknown (6/3/2022)    Received from [facility], [facility]    [employer] [place] [employer] - Occupational Stress Questionnaire     Feeling of Stress : Patient declined   Housing Stability: Unknown (8/8/2023)    Received from [facility], [facility]    Housing Stability Vital Sign     Unstable Housing in the Last Year: No       Review of patient's allergies indicates:   Allergen Reactions    Lisinopril Anaphylaxis    Pcn [penicillins] Other (See Comments)     Unknown, when young       Labs:  Lab Results   Component Value Date    HGBA1C 5.9 (H) 10/12/2021       No results found for: "WBC", "HGB", "HCT", "MCV", "PLT"        Review of Systems:  Low back pain.  Left thigh pain..  Balance of review of systems is negative.    Physical Exam:  Vitals:    10/18/24 1115   BP: 133/89   Pulse: 80   Weight: 101.4 kg (223 lb 7 oz)   Height: 5' 4" (1.626 m)   PainSc: 10-Worst " pain ever   PainLoc: Hip     Body mass index is 38.35 kg/m².    Gen: NAD  Psych: mood appropriate for given condition  HEENT: eyes anicteric   CV: RRR, 2+ radial pulse  HEENT: anicteric   Respiratory: non-labored, no signs of respiratory distress  Abd: non-distended  Skin: warm, dry and intact.  Gait: Able to heel walk, toe walk. No antalgic gait.     Coordination:   Tandem walking coordination: normal    Cervical spine: ROM is full in flexion, extension and lateral rotation without increased pain.  Spurling's maneuver causes no neck pain to either side.  Myofascial exam: No Tenderness to palpation across cervical paraspinous region bilaterally.    Lumbar spine:  Lumbar spine: ROM is full with flexion extension and oblique extension with no increased pain.    Samson's test causes no increased pain on either side.    Supine straight leg raise is negative bilaterally.    Internal and external rotation of the hip causes no increased pain on either side.  Myofascial exam: No tenderness to palpation across lumbar paraspinous muscles. No tenderness to palpation over the bilateral greater trochanters and bilateral SI joint    Sensory:  Intact and symmetrical to light touch in C4-T1 dermatomes bilaterally. Intact and symmetrical to light touch in L1-S1 dermatomes bilaterally.    Motor:    Right Left   C4 Shoulder Abduction  5  5   C5 Elbow Flexion    5  5   C6 Wrist Extension  5  5   C7 Elbow Extension   5  5   C8/T1 Hand Intrinsics   5  5        Right Left   L2/3 Iliacus Hip flexion  5  5   L3/4 Qudratus Femoris Knee Extension  5  5   L4/5 Tib Anterior Ankle Dorsiflexion   5  5   L5/S1 Extensor Hallicus Longus Great toe extension  5  5   S1/S2 Gastroc/Soleus Plantar Flexion  5  5      Right Left   Triceps DTR 2+ 2+   Biceps DTR 2+ 2+   Brachioradialis DTR 2+ 2+   Patellar DTR 2+ 2+   Achilles DTR 2+ 2+   Jones Absent  Absent   Clonus Absent Absent   Babinski Absent Absent       Imaging:    MRI lumbar spine report (she  did not bring the images to review) from [place] 9-11-23 states:    L1-2:  There is no disc bulge or focal protrusion.  There is no central spinal stenosis or foraminal narrowing.   L2-3:  There is no disc bulge or focal protrusion.  There is no central spinal stenosis or foraminal narrowing.   L3-4:  Left eccentric disc bulging and moderate posterior facet arthropathy. Mild left lateral recess stenosis and moderate left neural foraminal stenosis. No right neural foraminal stenosis.   L4-5:  Mild central disc bulging and posterior facet arthropathy.  There is no central spinal stenosis or foraminal narrowing.   L5-S1:  Minimal central disc bulging.  There is no central spinal stenosis or foraminal narrowing.       Assessment:   Natali Tubbs is a 48 y.o. year old female patient who presents for lacute onset right lower back and leg pain.  Possible right L5 radiculopathy.  No nneurological defitis.  No recent imaging.  Past MRI reoport stated a left disk hernia at L3/4, but no right sided disk hernia at that time. Pain is worse than previous.    Problem List Items Addressed This Visit    None  Visit Diagnoses       Lumbar radiculopathy, chronic    -  Primary    Relevant Orders    MRI Lumbar Spine Without Contrast              Plan:   - discussed role of medications, PT to help with pain.  - with level of pain, she does not feel she can tolerate going to PT at this time. She did agree to try exercise at home and I gave her instruction on exercise.  - will obtain MRI to consider HERNANDEZ.    - follow up after imaging.     Follow Up: RTC after imaging.     : Reviewed and consistent with medication use as prescribed.        Sharmin Aguirre PA-C  [facility]

## 2024-10-29 ENCOUNTER — PATIENT MESSAGE (OUTPATIENT)
Dept: PAIN MEDICINE | Facility: CLINIC | Age: 49
End: 2024-10-29
Payer: COMMERCIAL

## 2024-10-29 DIAGNOSIS — M54.16 LUMBAR RADICULOPATHY, CHRONIC: Primary | ICD-10-CM

## 2024-10-29 DIAGNOSIS — M51.26 HERNIATED LUMBAR INTERVERTEBRAL DISC: ICD-10-CM

## 2024-10-29 DIAGNOSIS — G89.29 CHRONIC BILATERAL LOW BACK PAIN, UNSPECIFIED WHETHER SCIATICA PRESENT: ICD-10-CM

## 2024-10-29 DIAGNOSIS — M54.50 CHRONIC BILATERAL LOW BACK PAIN, UNSPECIFIED WHETHER SCIATICA PRESENT: ICD-10-CM

## 2024-10-29 RX ORDER — LIDOCAINE 50 MG/G
1 PATCH TOPICAL DAILY
Qty: 30 PATCH | Refills: 0 | Status: SHIPPED | OUTPATIENT
Start: 2024-10-29

## 2024-10-31 ENCOUNTER — HOSPITAL ENCOUNTER (OUTPATIENT)
Dept: RADIOLOGY | Facility: HOSPITAL | Age: 49
Discharge: HOME | End: 2024-10-31
Attending: PHYSICIAN ASSISTANT
Payer: COMMERCIAL

## 2024-10-31 DIAGNOSIS — M54.16 LUMBAR RADICULOPATHY, CHRONIC: ICD-10-CM

## 2024-10-31 PROCEDURE — 72148 MRI LUMBAR SPINE W/O DYE: CPT | Mod: TC,PO

## 2024-10-31 PROCEDURE — 72148 MRI LUMBAR SPINE W/O DYE: CPT | Mod: 26,,, | Performed by: RADIOLOGY

## 2024-11-27 ENCOUNTER — TELEPHONE (OUTPATIENT)
Dept: PAIN MEDICINE | Facility: CLINIC | Age: 49
End: 2024-11-27
Payer: COMMERCIAL

## 2024-11-27 NOTE — TELEPHONE ENCOUNTER
----- Message from Sharmin Aguirre PA-C sent at 11/19/2024  9:12 AM CST -----  Results Reviewed.  Please call with below:    She was scheduled for follow up in clinic 11-1-24 and 11-8-24, both of which were cancelled.  Would she like to follow up to discuss results of MRI?    There are some degenerative chagnes in the spine causing pressure on the left nerve at L3/4 and L5/S1.  There is mild right sided nerve pressure at L4/5 and L5/S1.  I can go over the results with her in more detail in clinic and discuss options if she would like.

## 2024-11-27 NOTE — TELEPHONE ENCOUNTER
Spoke with patient and relayed Sharmin's result note. Patient voiced understanding and had no further questions. She has a f/u already scheduled for this upcoming Tuesday

## 2024-12-03 ENCOUNTER — OFFICE VISIT (OUTPATIENT)
Dept: PAIN MEDICINE | Facility: CLINIC | Age: 49
End: 2024-12-03
Payer: COMMERCIAL

## 2024-12-03 VITALS
DIASTOLIC BLOOD PRESSURE: 81 MMHG | WEIGHT: 225.88 LBS | BODY MASS INDEX: 38.77 KG/M2 | SYSTOLIC BLOOD PRESSURE: 131 MMHG | HEART RATE: 71 BPM

## 2024-12-03 DIAGNOSIS — M47.816 LUMBAR SPONDYLOSIS: ICD-10-CM

## 2024-12-03 DIAGNOSIS — M54.16 LUMBAR RADICULOPATHY: ICD-10-CM

## 2024-12-03 DIAGNOSIS — M54.16 LUMBAR RADICULOPATHY, CHRONIC: Primary | ICD-10-CM

## 2024-12-03 PROCEDURE — 1160F RVW MEDS BY RX/DR IN RCRD: CPT | Mod: CPTII,S$GLB,, | Performed by: PHYSICIAN ASSISTANT

## 2024-12-03 PROCEDURE — 99999 PR PBB SHADOW E&M-EST. PATIENT-LVL IV: CPT | Mod: PBBFAC,,, | Performed by: PHYSICIAN ASSISTANT

## 2024-12-03 PROCEDURE — 99214 OFFICE O/P EST MOD 30 MIN: CPT | Mod: S$GLB,,, | Performed by: PHYSICIAN ASSISTANT

## 2024-12-03 PROCEDURE — 3079F DIAST BP 80-89 MM HG: CPT | Mod: CPTII,S$GLB,, | Performed by: PHYSICIAN ASSISTANT

## 2024-12-03 PROCEDURE — 3075F SYST BP GE 130 - 139MM HG: CPT | Mod: CPTII,S$GLB,, | Performed by: PHYSICIAN ASSISTANT

## 2024-12-03 PROCEDURE — 1159F MED LIST DOCD IN RCRD: CPT | Mod: CPTII,S$GLB,, | Performed by: PHYSICIAN ASSISTANT

## 2024-12-03 PROCEDURE — 3008F BODY MASS INDEX DOCD: CPT | Mod: CPTII,S$GLB,, | Performed by: PHYSICIAN ASSISTANT

## 2024-12-03 RX ORDER — CYCLOBENZAPRINE HCL 10 MG
10 TABLET ORAL NIGHTLY PRN
Qty: 30 TABLET | Refills: 0 | Status: SHIPPED | OUTPATIENT
Start: 2024-12-03

## 2024-12-03 NOTE — PROGRESS NOTES
[facility]        PCP:   Luc Novoa MD    CC:   Chief Complaint   Patient presents with    Leg Pain          HPI:     Natali Tubbs is a 48 y.o. year old female patient who presnts for back pain.  She has had recent MRI lumbar spine.  She continues with pain.  Pain is mostly in the right lower back, buttock and right thigh at this point.  Right leg numbness and left leg pains seemed to have resolved.      HPI 10-18-24:  She has been followed for pain in the midline and right lower lumbar region to the right buttock, hip, posterior/ lateral leg to the lower calf.  She has had some left buttock pain as well.  Medrol taper helped some initially, but pain recurred and feels worse than at the time of her last visit.  She feels weak in the legs with going up stairs.      HPI 10-7-24:  Natali Tubbs is a 48 y.o. year old female patient who presnts for back pain.  She was seen in 2023 for lower back and some left thigh pain.  She has done well overall since last visits.  She tries to exercise regualrly.  About 1 week ago 9-30-24 while doing stretches, she thinks she may have pushed it too far.  The next day she felt pain.  Pain has been in the midline and right lower lumbar region to the right buttock, hip, posterior/ lateral leg to the lower calf.  Yesterday she started to develop left lower back and buttock pain. Over the last week she has tried robaxin, voltaren, flexeril.    HPI 9-21-23:   returns for follow up of lower back and left thigh pain.  Since last visit, she started walking more and moving around.  Pain has improved.  Left thigh pain resolved.  She has started to feel some lower back pain again, but it is relieved with stretching forward.  She is pleased with progress.  She did not start PT due to pain improveing on its own and due to caring for her mother who recently passed away.    INitial HPI:   She was last seen with me in Auguast 2022 for chronic  lower back pain greater than radicular leg pain.  She underwent healthy back PT.  Pain significantly improved and she was doing well.  She has been helping care for her mom.  A few days ago she helped turn her mom and she felt a pop in the left hip region.  She has pain in the left lower back, hip and left anteiror/ lateral thigh.  She was seen in the ER at [facility] to severity of pain 7/27/23.  Pain increases with certain movements and does improve with sitting.  She has taken motrin, medrol taper, robaxin, flexeril, I'm toradol. Has tried heat, ice, tens unit massage.  Pain persists.     Denies bowel/ bladder incontinence.    Past and current medications:  Antineuropathics:  NSAIDs:  tried voltared  Antidepressants: wellbutrin  Muscle relaxers:  flexeril, robaxin  Opioids:  Antiplatelets/Anticoagulants:  Others:  medrol taper in past and recently completed.     Physical Therapy/ Chiropractic care:  Healthy back PT 8/31/22 to 11/3/22    Pain Intervention History:  none    Past Spine Surgical History:  none        History:    Current Outpatient Medications:     buPROPion (WELLBUTRIN XL) 300 MG 24 hr tablet, Take 300 mg by mouth., Disp: , Rfl:     LIDOcaine (LIDODERM) 5 %, Place 1 patch onto the skin once daily. Remove & Discard patch within 12 hours or as directed by MD, Disp: 30 patch, Rfl: 0    nebivoloL (BYSTOLIC) 20 mg Tab, Take 20 mg by mouth once daily., Disp: , Rfl:     NURTEC 75 mg odt, Take by mouth as needed., Disp: , Rfl:     pantoprazole (PROTONIX) 40 MG tablet, Take 40 mg by mouth., Disp: , Rfl:     triamterene-hydrochlorothiazide 37.5-25 mg (DYAZIDE) 37.5-25 mg per capsule, Take 1 capsule by mouth every morning., Disp: , Rfl:     valACYclovir (VALTREX) 500 MG tablet, Take 500 mg by mouth as needed., Disp: , Rfl:     VYVANSE 60 mg capsule, Take 60 mg by mouth every morning., Disp: , Rfl:     cetirizine (ZYRTEC) 10 MG tablet, Take 10 mg by mouth as needed., Disp: , Rfl:     cyclobenzaprine  "(FLEXERIL) 10 MG tablet, Take 1 tablet (10 mg total) by mouth nightly as needed for Muscle spasms., Disp: 30 tablet, Rfl: 0    History reviewed. No pertinent past medical history.    History reviewed. No pertinent surgical history.    No family history on file.    Social History     Socioeconomic History    Marital status:    Tobacco Use    Smoking status: Never    Smokeless tobacco: Never     Social Drivers of Health     Financial Resource Strain: Unknown (6/3/2022)    Received from [facility], [facility]    Overall Financial Resource Strain (CARDIA)     Difficulty of Paying Living Expenses: Patient declined   Food Insecurity: Unknown (8/8/2023)    Received from [facility], [facility]    Hunger Vital Sign     Ran Out of Food in the Last Year: Never true   Transportation Needs: Unknown (8/8/2023)    Received from [facility], [facility]    PRAPARE - Transportation     Lack of Transportation (Medical): No   Physical Activity: Unknown (6/3/2022)    Received from [facility], [facility]    Exercise Vital Sign     Days of Exercise per Week: Patient declined     Minutes of Exercise per Session: Patient declined   Stress: Unknown (6/3/2022)    Received from [facility], [facility]    [employer] [place] [employer] - Occupational Stress Questionnaire     Feeling of Stress : Patient declined   Housing Stability: Unknown (8/8/2023)    Received from [facility] [employer], [facility]    Housing Stability Vital Sign     Unstable Housing in the Last Year: No       Review of patient's allergies indicates:   Allergen Reactions    Lisinopril Anaphylaxis    Pcn [penicillins] Other (See Comments)     Unknown, when young       Labs:  Lab Results   Component Value Date    HGBA1C 5.9 (H) 10/12/2021       No results found for: "WBC", "HGB", "HCT", "MCV", "PLT"        Review of Systems:  Low back pain.  Left thigh pain..  Balance of review of " systems is negative.    Physical Exam:  Vitals:    12/03/24 0828   BP: 131/81   Pulse: 71   Weight: 102.4 kg (225 lb 13.8 oz)   PainSc:   2   PainLoc: Leg     Body mass index is 38.77 kg/m².    Gen: NAD  Psych: mood appropriate for given condition  HEENT: eyes anicteric   CV: RRR, 2+ radial pulse  HEENT: anicteric   Respiratory: non-labored, no signs of respiratory distress  Abd: non-distended  Skin: warm, dry and intact.  Gait: Able to heel walk, toe walk. No antalgic gait.     Coordination:   Tandem walking coordination: normal    Cervical spine: ROM is full in flexion, extension and lateral rotation without increased pain.  Spurling's maneuver causes no neck pain to either side.  Myofascial exam: No Tenderness to palpation across cervical paraspinous region bilaterally.    Lumbar spine:  Lumbar spine: ROM is full with flexion extension and oblique extension with no increased pain.    Samson's test causes no increased pain on either side.    Supine straight leg raise is negative bilaterally.    Internal and external rotation of the hip causes no increased pain on either side.  Myofascial exam: No tenderness to palpation across lumbar paraspinous muscles. No tenderness to palpation over the bilateral greater trochanters and bilateral SI joint    Sensory:  Intact and symmetrical to light touch in C4-T1 dermatomes bilaterally. Intact and symmetrical to light touch in L1-S1 dermatomes bilaterally.    Motor:    Right Left   C4 Shoulder Abduction  5  5   C5 Elbow Flexion    5  5   C6 Wrist Extension  5  5   C7 Elbow Extension   5  5   C8/T1 Hand Intrinsics   5  5        Right Left   L2/3 Iliacus Hip flexion  5  5   L3/4 Qudratus Femoris Knee Extension  5  5   L4/5 Tib Anterior Ankle Dorsiflexion   5  5   L5/S1 Extensor Hallicus Longus Great toe extension  5  5   S1/S2 Gastroc/Soleus Plantar Flexion  5  5      Right Left   Triceps DTR 2+ 2+   Biceps DTR 2+ 2+   Brachioradialis DTR 2+ 2+   Patellar DTR 2+ 2+   Achilles  DTR 2+ 2+   Jones Absent  Absent   Clonus Absent Absent   Babinski Absent Absent       Imaging:    MRI lumbar spine report (she did not bring the images to review) from [facility] 9-11-23 states:    L1-2:  There is no disc bulge or focal protrusion.  There is no central spinal stenosis or foraminal narrowing.   L2-3:  There is no disc bulge or focal protrusion.  There is no central spinal stenosis or foraminal narrowing.   L3-4:  Left eccentric disc bulging and moderate posterior facet arthropathy. Mild left lateral recess stenosis and moderate left neural foraminal stenosis. No right neural foraminal stenosis.   L4-5:  Mild central disc bulging and posterior facet arthropathy.  There is no central spinal stenosis or foraminal narrowing.   L5-S1:  Minimal central disc bulging.  There is no central spinal stenosis or foraminal narrowing.     MRI lumbar spine [facility] 10-31-24 reviewed:  FINDINGS:  Vertebral column: The lumbar vertebral bodies maintain normal height.  There is no fracture.  Alignment is normal.  There is no significant disc space narrowing.  The L5-S1 disc is mildly desiccated.  Baseline marrow signal is normal.     Spinal canal, conus, epidural space: The spinal canal is developmentally normal.  The conus terminates at the level of L1-2 and is normal in contour and signal intensity.     Findings by level:     On the sagittal images, there is disc space narrowing at the T11-12 level where there is also mild disc bulge.  There is no spinal stenosis or cord compression.     T12-L1: There is no spinal canal or significant foraminal stenosis.     L1-2: There is no spinal canal or significant foraminal stenosis.     L2-3: There is mild facet joint arthropathy.  There is no spinal canal or significant foraminal stenosis.     L3-4: There is mild facet joint arthropathy.  There is mild ligamentum flavum thickening.  There are small bilateral facet joint effusions.  There is a disc bulge eccentric to the left  with osteophytic ridging.  There is moderate to severe left foraminal stenosis without spinal canal or right foraminal stenosis.  There is mild crowding of the left lateral recess.     L4-5: There is a mild diffuse disc bulge with osteophytic ridging.  There is mild facet joint arthropathy with small bilateral facet joint effusions.  There is no spinal stenosis.  There is moderate right and mild left foraminal stenosis.     L5-S1: There is a disc bulge with mild osteophytic ridging and subtle left posterolateral annular fissure.  There is mild facet joint arthropathy.  There is no spinal stenosis.  There is mild right but at least moderate left foraminal stenosis.     Soft tissues, other: The prevertebral soft tissues are normal.  The aorta is normal in caliber.  There is no hydronephrosis.     Impression:     1. There is degenerative change in the lumbar spine discussed in detail by level above.  There is no fracture or malalignment but there is some degree of disc bulge with osteophytic ridging and facet joint arthropathy at several levels.  The pertinent findings are summarized below.  2. At the L3-4 level there is moderate to severe left foraminal stenosis as well as mild crowding of the left lateral recess.  3. At the L4-5 level there is moderate right and mild left foraminal stenosis without spinal stenosis.  4. At the L5-S1 level there is moderate left and mild right foraminal stenosis without spinal stenosis.       Assessment:   Natali Tubbs is a 48 y.o. year old female patient who presents for lacute onset right lower back and leg pain.      Updated lumbar MRI shows degenerative changes in the lower lumbar spine with right foraminal narrowign more at L4/5 than L5/S1.  She has some left foraminal narrowing as well, but no current left sided symptoms.     Lower back pain and right L5, S1 radiculoapthy.  No neurological deficits.     Problem List Items Addressed This Visit    None  Visit Diagnoses        Lumbar radiculopathy, chronic    -  Primary    Relevant Orders    Ambulatory referral/consult to Physical/Occupational Therapy    Lumbar spondylosis        Relevant Orders    Ambulatory referral/consult to Physical/Occupational Therapy    Lumbar radiculopathy        Relevant Medications    cyclobenzaprine (FLEXERIL) 10 MG tablet              Plan:   - discussed role of medications, PT to help with pain and right L4/5, L5/S1 TF Mikala to help with pain.  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  - will refill flexeril as well    Follow Up: RTC if no improvement with PT    : Reviewed and consistent with medication use as prescribed.        Sharmin Aguirre PA-C  [facility]

## 2024-12-17 ENCOUNTER — CLINICAL SUPPORT (OUTPATIENT)
Dept: REHABILITATION | Facility: HOSPITAL | Age: 49
End: 2024-12-17
Payer: COMMERCIAL

## 2024-12-17 DIAGNOSIS — M47.816 LUMBAR SPONDYLOSIS: ICD-10-CM

## 2024-12-17 DIAGNOSIS — M54.16 LUMBAR RADICULOPATHY, CHRONIC: ICD-10-CM

## 2024-12-17 PROCEDURE — 97110 THERAPEUTIC EXERCISES: CPT | Mod: PN

## 2024-12-17 PROCEDURE — 97161 PT EVAL LOW COMPLEX 20 MIN: CPT | Mod: PN

## 2024-12-17 NOTE — PLAN OF CARE
[facility]   Physical Therapy Initial Evaluation     Date: 12/17/2024   Name: Natali Tubbs  Clinic Number: [id number]    Therapy Diagnosis:   Encounter Diagnoses   Name Primary?    Lumbar radiculopathy, chronic     Lumbar spondylosis      Physician: Sharmin Agiurre PA-C    Physician Orders: PT Eval and Treat   Medical Diagnosis from Referral:   M54.16 (ICD-10-CM) - Lumbar radiculopathy, chronic   M47.816 (ICD-10-CM) - Lumbar spondylosis   Evaluation Date: 12/17/2024  Authorization Period Expiration: 12/3/2025  Plan of Care Expiration: 2/17/2025  Progress Note Due: 1/17/2025  Visit # / Visits authorized: 1/ 1   FOTO: 1/ 3     Precautions: Standard    Time In: 7:10 am  Time Out: 8:00 am  Total Appointment Time (timed & untimed codes): 50 minutes      SUBJECTIVE   Date of onset: 4 weeks ago    History of current condition - Justin reports: history of low back pain with recent exacerbation of symptoms about a month ago.  She had increased pain down right leg and reports her pain is made worse with twisting, reaching, lifting activities.  Patient is reporting symptoms are not going all the way down her leg anymore, but continues to have consistent pain down to knee level.  Patient was participating in strength training at home and performed backward kick that started her pain and would like to return to strength training.    Falls: no    Imaging, MRI studies:   Impression:     1. There is degenerative change in the lumbar spine discussed in detail by level above.  There is no fracture or malalignment but there is some degree of disc bulge with osteophytic ridging and facet joint arthropathy at several levels.  The pertinent findings are summarized below.  2. At the L3-4 level there is moderate to severe left foraminal stenosis as well as mild crowding of the left lateral recess.  3. At the L4-5 level there is moderate right and mild left foraminal stenosis without spinal  stenosis.  4. At the L5-S1 level there is moderate left and mild right foraminal stenosis without spinal stenosis.    Prior Therapy: yes  Social History:  lives with their family  Occupation: nurse practitioner   Prior Level of Function: independent  Current Level of Function: has not returned to normal workout routine    Pain:  Current 0/10, worst 9/10, best 0/10   Location:   right side back and right leg  Description: cramping, grabbing  Aggravating Factors: certain movements with twisting, turning, reaching  Easing Factors: massage, ice, and heating pad    Patients goals: return to strength training at home 3-4 times a week     Medical History:   No past medical history on file.    Surgical History:   Natali Tubbs  has no past surgical history on file.    Medications:   Natali has a current medication list which includes the following prescription(s): bupropion, cetirizine, cyclobenzaprine, lidocaine, nebivolol, nurtec, pantoprazole, triamterene-hydrochlorothiazide 37.5-25 mg, valacyclovir, and vyvanse.    Allergies:   Review of patient's allergies indicates:   Allergen Reactions    Lisinopril Anaphylaxis    Pcn [penicillins] Other (See Comments)     Unknown, when young          OBJECTIVE     Red Flag Screening:  Cough  Sneeze  Strain: (--)  Bladder/ bowel: (--)  Falls: (--)  Night pain: (--)  Unexplained weight loss: (--)  General health: good       Observation: good mobility, able to perform squat and dead lift with good form and technique    Posture:  normal    Lumbar Range of Motion:    Degrees Pain   Flexion Reach floor   no        Extension 25 deg   no        Left Side Bending Reach knee no        Right Side Bending Reach knee no        Left rotation   45 deg no        Right Rotation   45 deg no             Lower Extremity Strength  Right LE  Left LE    Knee extension: 5/5 Knee extension: 5/5   Knee flexion: 5/5 Knee flexion: 5/5   Hip flexion: 5/5 Hip flexion: 5/5   Hip extension:  5/5 Hip  extension: 5/5   Hip abduction: 5/5 Hip abduction: 5/5   Hip adduction: 5/5 Hip adduction 5/5   Ankle dorsiflexion: 5/5 Ankle dorsiflexion: 5/5   Ankle plantarflexion: 5/5 Ankle plantarflexion: 5/5         Special Tests:  -Repeated Flexion: no change  -Repeated Ext: no change  -Piriformis Test: negative  -Prone Instability Test: not tested  -SOFÍA: negative  -Thigh thrust: negative    Neuro Dynamic Testing:    Sciatic nerve:      SLR: R = negative     L = negative      Slump: R = negative     L = negative       Femoral Nerve:    Femoral nerve test: negative      Joint Mobility: normal    Palpation: minimal tenderness to palpation noticed on this date    Sensation: intact to light touch    Flexibility:    90/90 hamstring test: R = 15 degrees ; L = 15 degrees   Antonio test: R = tightness in quads ; L = normal        Intake Outcome Measure for FOTO Lumbar Spine Survey    Therapist reviewed FOTO scores for Natali Tubbs on 12/17/2024.   FOTO documents entered into [employer] - see Media section.    Intake Score: 63%           TREATMENT     Total Treatment time (time-based codes) separate from Evaluation: 10 minutes     Justin received the treatments listed below:       therapeutic exercises to develop strength, endurance, ROM, flexibility, posture, and core stabilization for (10)  minutes including:  Patient education on proper form and technique of exercises at home  Instructed on and demonstrated HEP       PATIENT EDUCATION AND HOME EXERCISES     Education provided:   - role of PT and goals for therapy  -HEP    Written Home Exercises Provided: yes. Exercises were reviewed and Justin was able to demonstrate them prior to the end of the session.  Justin demonstrated good  understanding of the education provided. See EMR under Patient Instructions for exercises provided during therapy sessions.    ASSESSMENT     Natali is a 49 y.o. female referred to outpatient Physical Therapy with a medical diagnosis of lumbar  radiculopathy chronic.  Patient presents with signs and symptoms consistent with the diagnosis.  She is noted to have decreased symptoms at this time, normal LROM, normal LE strength, slight decreased LE flexibility, decreased joint mobility, and tight/tender surrounding musculature causing difficulty performing her normal workout routine.  Pt would benefit from manual therapy, LE stretching and strengthening, core stabilization exercises, and modalities to decrease pain, improve functional mobility, and return to prior level of function.       Patient prognosis is Good.   Patientt will benefit from skilled outpatient Physical Therapy to address the deficits stated above and in the chart below, provide patient /family education, and to maximize patientt's level of independence.     Plan of care discussed with patient: Yes  Patient's spiritual, cultural and educational needs considered and patient is agreeable to the plan of care and goals as stated below:     Anticipated Barriers for therapy: nione    Medical Necessity is demonstrated by the following  History  Co-morbidities and personal factors that may impact the plan of care [x] LOW: no personal factors / co-morbidities  [] MODERATE: 1-2 personal factors / co-morbidities  [] HIGH: 3+ personal factors / co-morbidities    Moderate / High Support Documentation:   Co-morbidities affecting plan of care:     Personal Factors:   no deficits     Examination  Body Structures and Functions, activity limitations and participation restrictions that may impact the plan of care [x] LOW: addressing 1-2 elements  [] MODERATE: 3+ elements  [] HIGH: 4+ elements (please support below)    Moderate / High Support Documentation:      Clinical Presentation [x] LOW: stable  [] MODERATE: Evolving  [] HIGH: Unstable     Decision Making/ Complexity Score: low       Goals:  Long Term Goals:  8 weeks  Patient will be compliant with HEP to promote the independent management of current  diagnosis.  dressing.  Patient will report a decrease in complaints of low back pain to 1/10 during normal workout routine.  Patient will improve FOTO status from 63% to 74% indicating increased functional mobility.      PLAN   Plan of care Certification: 12/17/2024 to 2/17/2025.    Outpatient Physical Therapy 1 times weekly for 8 weeks to include the following interventions: Cervical/Lumbar Traction, Electrical Stimulation IFC, Gait Training, Manual Therapy, Moist Heat/ Ice, Neuromuscular Re-ed, Patient Education, and Self Care.     Nayan Yancey, PT      I CERTIFY THE NEED FOR THESE SERVICES FURNISHED UNDER THIS PLAN OF TREATMENT AND WHILE UNDER MY CARE   Physician's comments:     Physician's Signature: ___________________________________________________